# Patient Record
Sex: MALE | Race: WHITE | Employment: FULL TIME | URBAN - METROPOLITAN AREA
[De-identification: names, ages, dates, MRNs, and addresses within clinical notes are randomized per-mention and may not be internally consistent; named-entity substitution may affect disease eponyms.]

---

## 2019-05-21 LAB
HIV 1&2 EXT: NORMAL
INR PPP: 1.1 (ref 0.9–1.1)
TSH SERPL-ACNC: 1.4 UIU/ML (ref 0.3–4.5)

## 2019-05-28 ENCOUNTER — TRANSFERRED RECORDS (OUTPATIENT)
Dept: HEALTH INFORMATION MANAGEMENT | Facility: CLINIC | Age: 59
End: 2019-05-28

## 2019-06-07 ENCOUNTER — TRANSFERRED RECORDS (OUTPATIENT)
Dept: HEALTH INFORMATION MANAGEMENT | Facility: CLINIC | Age: 59
End: 2019-06-07

## 2020-04-16 LAB
ALT SERPL-CCNC: 35 U/L (ref 14–63)
AST SERPL-CCNC: 22 U/L (ref 15–37)
GLUCOSE SERPL-MCNC: 158 MG/DL (ref 74–106)
HBA1C MFR BLD: 7.7 % (ref 4.8–6)
HEP C HIM: NORMAL
POTASSIUM SERPL-SCNC: 4 MMOL/L (ref 3.5–5.1)

## 2020-05-12 ENCOUNTER — TRANSFERRED RECORDS (OUTPATIENT)
Dept: HEALTH INFORMATION MANAGEMENT | Facility: CLINIC | Age: 60
End: 2020-05-12

## 2020-06-12 ENCOUNTER — TRANSFERRED RECORDS (OUTPATIENT)
Dept: HEALTH INFORMATION MANAGEMENT | Facility: CLINIC | Age: 60
End: 2020-06-12

## 2020-06-12 ENCOUNTER — TRANSFERRED RECORDS (OUTPATIENT)
Dept: MULTI SPECIALTY CLINIC | Facility: CLINIC | Age: 60
End: 2020-06-12

## 2020-06-12 LAB
CREAT SERPL-MCNC: 0.6 MG/DL (ref 0.7–1.2)
GFR SERPL CREATININE-BSD FRML MDRD: >60 ML/MIN/1.73M2

## 2020-06-23 ENCOUNTER — TRANSCRIBE ORDERS (OUTPATIENT)
Dept: OTHER | Age: 60
End: 2020-06-23

## 2020-06-23 DIAGNOSIS — C22.0 HEPATOCELLULAR CARCINOMA (H): Primary | ICD-10-CM

## 2020-06-25 NOTE — TELEPHONE ENCOUNTER
RECORDS RECEIVED FROM: CE - Hepatocellular carcinoma .. Referred by Juliann Melgoza ... scheduled per patient.. patient having labs done on monday and will have them faxed to the clinic   Appt Date: 07.21.2020   NOTES STATUS DETAILS   OFFICE NOTE from referring provider Care Everywhere 06.19.2020 Juliann Melgoza, APRN, CNP  [facility]   OFFICE NOTES from other specialists N/A    DISCHARGE SUMMARY from hospital N/A    MEDICATION LIST Care Everywhere    LIVER BIOSPY (IF APPLICABLE)      PATHOLOGY REPORTS  N/A    IMAGING     ENDOSCOPY (IF AVAILABLE) N/A    COLONOSCOPY (IF AVAILABLE) N/A    ULTRASOUND LIVER N/A    CT OF ABDOMEN Care Everywhere 06.12.2020, 06.07.2019 CT Liver [facility]     MRI OF LIVER N/A    FIBROSCAN, US ELASTOGRAPHY, FIBROSIS SCAN, MR ELASTOGRAPHY N/A    LABS     HEPATIC PANEL (LIVER PANEL) Care Everywhere 08.05.2019   BASIC METABOLIC PANEL Care Everywhere 09.26.2019   COMPLETE METABOLIC PANEL Care Everywhere 04.16.2020   COMPLETE BLOOD COUNT (CBC) Care Everywhere 04.16.2020   INTERNATIONAL NORMALIZED RATIO (INR) N/A    HEPATITIS C ANTIBODY Care Everywhere 01.02.2020 04.19.2019   HEPATITIS C VIRAL LOAD/PCR N/A    HEPATITIS C GENOTYPE N/A    HEPATITIS B SURFACE ANTIGEN Care Everywhere 04.19.2019   HEPATITIS B SURFACE ANTIBODY Care Everywhere 04.19.2019   HEPATITIS B DNA QUANT LEVEL N/A    HEPATITIS B CORE ANTIBODY Care Everywhere 04.19.2019

## 2020-07-01 ENCOUNTER — VIRTUAL VISIT (OUTPATIENT)
Dept: GASTROENTEROLOGY | Facility: CLINIC | Age: 60
End: 2020-07-01
Attending: INTERNAL MEDICINE
Payer: COMMERCIAL

## 2020-07-01 ENCOUNTER — PRE VISIT (OUTPATIENT)
Dept: GASTROENTEROLOGY | Facility: CLINIC | Age: 60
End: 2020-07-01

## 2020-07-01 DIAGNOSIS — C22.0 HCC (HEPATOCELLULAR CARCINOMA) (H): Primary | ICD-10-CM

## 2020-07-01 DIAGNOSIS — B18.2 CHRONIC HEPATITIS C WITHOUT HEPATIC COMA (H): ICD-10-CM

## 2020-07-01 RX ORDER — OXYCODONE HYDROCHLORIDE 10 MG/1
10 TABLET ORAL
COMMUNITY
Start: 2019-04-08 | End: 2020-07-22

## 2020-07-01 RX ORDER — OMEPRAZOLE 40 MG/1
40 CAPSULE, DELAYED RELEASE ORAL
COMMUNITY
End: 2020-07-24

## 2020-07-01 RX ORDER — LISINOPRIL 20 MG/1
40 TABLET ORAL EVERY MORNING
COMMUNITY

## 2020-07-01 RX ORDER — GLIPIZIDE 10 MG/1
10 TABLET, FILM COATED, EXTENDED RELEASE ORAL EVERY MORNING
COMMUNITY
Start: 2019-02-01

## 2020-07-01 RX ORDER — ATENOLOL 100 MG/1
100 TABLET ORAL EVERY MORNING
COMMUNITY
Start: 2019-03-11

## 2020-07-01 RX ORDER — POLYETHYLENE GLYCOL 3350 17 G/17G
1 POWDER, FOR SOLUTION ORAL PRN
COMMUNITY
Start: 2019-04-05

## 2020-07-01 ASSESSMENT — PAIN SCALES - GENERAL: PAINLEVEL: EXTREME PAIN (8)

## 2020-07-01 NOTE — PROGRESS NOTES
"Jerome Schoen is a 59 year old male who is being evaluated via a billable telephone visit.      The patient has been notified of following:     \"This telephone visit will be conducted via a call between you and your physician/provider. We have found that certain health care needs can be provided without the need for a physical exam.  This service lets us provide the care you need with a short phone conversation.  If a prescription is necessary we can send it directly to your pharmacy.  If lab work is needed we can place an order for that and you can then stop by our lab to have the test done at a later time.    Telephone visits are billed at different rates depending on your insurance coverage. During this emergency period, for some insurers they may be billed the same as an in-person visit.  Please reach out to your insurance provider with any questions.    If during the course of the call the physician/provider feels a telephone visit is not appropriate, you will not be charged for this service.\"    Patient has given verbal consent for Telephone visit?  Yes    What phone number would you like to be contacted at? [phone]    How would you like to obtain your AVS? 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      HISTORY OF PRESENT ILLNESS:  Mr. Schoen is a 59-year-old  male from [place].  He did get diagnosed almost a year and half ago with hepatitis C.  He also has a history of alcohol use.  At that time, he was diagnosed with cirrhosis of the liver.  He did do treatment with glecaprevir/pibrentasvir, for 8 weeks and it appears that he cleared the virus and achieved a sustained virological response.  Anyway, on followup he did have a CT " scan of the liver with and without IV contrast and that was done for his cirrhosis and it showed, besides the cirrhosis with multiple indeterminate foci, a 1.8 cm hyperenhancing lesion and this was read as LI-RADS 3, but then MRI was asked.  The patient has issues with claustrophobia; hence, he did not have it and then now repeated CT scan  done on 06/12/2020.  It did show, besides the nodular liver, a 1.8 cm arterially enhancing lesion, which they thought demonstrated central washout and pseudocapsule formation, and this was read as diagnostic for hepatocellular carcinoma LI-RADS 5.  Please note that the lesion is in segment Lima.      Symptom-wise, he is still working as a .  He does not have any significant fluid retention nor does he have any abdominal distention.  He has no signs of confusion, lethargy and the like.  Never had GI bleeding.  He had an upper endoscopy done which showed small esophageal varices, varices grade 1, and portal hypertensive gastropathy.  He complains of right upper quadrant abdominal pain.  He denies any nausea or vomiting.  He has lost also around 35 pounds in the last 6-7 months.  He did have regular bowel movements and in fact had a colonoscopy at age 50.  Otherwise, he states his appetite is good.  He did not have any signs of jaundice.  He does not have any fever or shortness of breath, chest pain or cough.     Medical hx Surgical hx   Past Medical History:   Diagnosis Date     Cirrhosis (H)      Continuous tobacco abuse      DM type 2 (diabetes mellitus, type 2) (H)      HCC (hepatocellular carcinoma) (H)      HCV (hepatitis C virus)      HTN (hypertension)       No past surgical history on file.       Medications  Prior to Admission medications    Medication Sig Start Date End Date Taking? Authorizing Provider   atenolol (TENORMIN) 100 MG tablet  3/11/19  Yes Reported, Patient   glipiZIDE (GLUCOTROL XL) 10 MG 24 hr tablet Take 10 mg by mouth 2/1/19  Yes Reported,  Patient   lisinopril (ZESTRIL) 20 MG tablet Take 40 mg by mouth   Yes Reported, Patient   metFORMIN (GLUCOPHAGE) 500 MG tablet Take 500 mg by mouth 2/1/19  Yes Reported, Patient   omeprazole (PRILOSEC) 40 MG DR capsule Take 40 mg by mouth   Yes Reported, Patient   oxyCODONE IR (ROXICODONE) 10 MG tablet Take 10 mg by mouth 4/8/19 7/19/20 Yes Reported, Patient   polyethylene glycol (MIRALAX) 17 GM/SCOOP powder Use 1 (one) Capful daily 4/5/19  Yes Reported, Patient       Allergies  No Known Allergies    Family hx Social hx   Family History   Problem Relation Age of Onset     Cerebrovascular Disease Mother      Coronary Artery Disease Brother       Social History     Tobacco Use     Smoking status: Current Every Day Smoker     Types: Cigarettes     Smokeless tobacco: Never Used   Substance Use Topics     Alcohol use: Not Currently     Drug use: Not Currently          REVIEW OF SYSTEMS:  Denies any recent infections, has fatigue.  No headaches or seizures in the past, has some cough.  Please note that the patient has been a big time smoker from early age of 13.  He has some shortness of breath.  Denies any chest pain.  He denies any psychiatric diagnoses.  He has no eye, hearing or skin issues.  He has degenerative joint disease, mostly of the knees, and he claims to have some form of neuropathy.     Examination  There were no vitals taken for this visit.  There is no height or weight on file to calculate BMI.    Gen- well, NAD, A+Ox3, normal color  Extr- No KIMBERLEY  Neuro- A+Ox3, no asterixis  Skin- no rash or jaundice  Psych- normal mood    Laboratory  No results found for: NA, POTASSIUM, CHLORIDE, CO2, BUN, CR    No results found for: BILITOTAL, ALT, AST, ALKPHOS    No results found for: ALBUMIN, PROTTOTAL     No results found for: WBC, HGB, MCV, PLT    No results found for: INR      Radiology    ASSESSMENT AND PLAN:  Hepatocellular carcinoma.        Mr. Schoen has a small lesion of 1.8 in segment 4, which is compatible  with LI-RADS 5 lesion.  This is HCC as it has all the radiological characteristics.  We would like this repeated with an MRI which will be ordered.  The patient claims that he might have issues with claustrophobia.  If that is the case, then we will give him sedation when he is scheduled for an MRI.  We did explain that to him and he is acceptable of it.        He will be seen by Interventional Radiology as a consult to see if this could be treated.  We also discussed with him that since he has underlying cirrhosis that eventually he might benefit being evaluated for liver transplantation.  He has no idea what that entails and we explained to him what it is, and he is going to think about it.        He has a long time history of smoking.  He will need  Cardiology and Pulmonary to weigh in and he will need to quit tobacco and maybe even join some of those tobacco cessation programs.  We did explain all that to him.  He understands that.        Otherwise, he has cleared the hepatitis C, which is good as this is now well documented that early treatment with interferon-free direct acting antivirals in patients with HCC might increase survival and reduce mortality.        We will see him here within the coming 8-12 weeks, and in the interim, he will be seeing IR.  We also will repeat his labs at that time.  Please note, his alpha fetoproteins were normal.      The test was a 36-minute phone conversation with the patient.  We reviewed also his chart which took us another 15 minutes minimum, so entire visit lasted 50 minutes.     Lupe Mae MD  Hepatology  [facility]

## 2020-07-01 NOTE — LETTER
7/1/2020         RE: Jerome Schoen  [address]  [facility] [address]        Dear Colleague,    Thank you for referring your patient, Jerome Schoen, to the [facility] HEPATOLOGY. Please see a copy of my visit note below.    Jerome Schoen is a 59 year old male who is being evaluated via a billable telephone visit.            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      HISTORY OF PRESENT ILLNESS:  Mr. Schoen is a 59-year-old  male from [place].  He did get diagnosed almost a year and half ago with hepatitis C.  He also has a history of alcohol use.  At that time, he was diagnosed with cirrhosis of the liver.  He did do treatment with glecaprevir/pibrentasvir, for 8 weeks and it appears that he cleared the virus and achieved a sustained virological response.  Anyway, on followup he did have a CT scan of the liver with and without IV contrast and that was done for his cirrhosis and it showed, besides the cirrhosis with multiple indeterminate foci, a 1.8 cm hyperenhancing lesion and this was read as LI-RADS 3, but then MRI was asked.  The patient has issues with claustrophobia; hence, he did not have it and then now repeated CT scan  done on 06/12/2020.  It did show, besides the nodular liver, a 1.8 cm arterially enhancing lesion, which they thought demonstrated central washout and pseudocapsule formation, and this was read as diagnostic for hepatocellular carcinoma LI-RADS 5.  Please note that the lesion is in segment Lima.      Symptom-wise, he is still working as a .  He does not have any significant fluid retention nor does he have any abdominal distention.  He has no signs of confusion, lethargy and the like.  Never had GI bleeding.   He had an upper endoscopy done which showed small esophageal varices, varices grade 1, and portal hypertensive gastropathy.  He complains of right upper quadrant abdominal pain.  He denies any nausea or vomiting.  He has lost also around 35 pounds in the last 6-7 months.  He did have regular bowel movements and in fact had a colonoscopy at age 50.  Otherwise, he states his appetite is good.  He did not have any signs of jaundice.  He does not have any fever or shortness of breath, chest pain or cough.     Medical hx Surgical hx   Past Medical History:   Diagnosis Date     Cirrhosis (H)      Continuous tobacco abuse      DM type 2 (diabetes mellitus, type 2) (H)      HCC (hepatocellular carcinoma) (H)      HCV (hepatitis C virus)      HTN (hypertension)       No past surgical history on file.       Medications  Prior to Admission medications    Medication Sig Start Date End Date Taking? Authorizing Provider   atenolol (TENORMIN) 100 MG tablet  3/11/19  Yes Reported, Patient   glipiZIDE (GLUCOTROL XL) 10 MG 24 hr tablet Take 10 mg by mouth 2/1/19  Yes Reported, Patient   lisinopril (ZESTRIL) 20 MG tablet Take 40 mg by mouth   Yes Reported, Patient   metFORMIN (GLUCOPHAGE) 500 MG tablet Take 500 mg by mouth 2/1/19  Yes Reported, Patient   omeprazole (PRILOSEC) 40 MG DR capsule Take 40 mg by mouth   Yes Reported, Patient   oxyCODONE IR (ROXICODONE) 10 MG tablet Take 10 mg by mouth 4/8/19 7/19/20 Yes Reported, Patient   polyethylene glycol (MIRALAX) 17 GM/SCOOP powder Use 1 (one) Capful daily 4/5/19  Yes Reported, Patient       Allergies  No Known Allergies    Family hx Social hx   Family History   Problem Relation Age of Onset     Cerebrovascular Disease Mother      Coronary Artery Disease Brother       Social History     Tobacco Use     Smoking status: Current Every Day Smoker     Types: Cigarettes     Smokeless tobacco: Never Used   Substance Use Topics     Alcohol use: Not Currently     Drug use: Not Currently           REVIEW OF SYSTEMS:  Denies any recent infections, has fatigue.  No headaches or seizures in the past, has some cough.  Please note that the patient has been a big time smoker from early age of 13.  He has some shortness of breath.  Denies any chest pain.  He denies any psychiatric diagnoses.  He has no eye, hearing or skin issues.  He has degenerative joint disease, mostly of the knees, and he claims to have some form of neuropathy.     Examination  There were no vitals taken for this visit.  There is no height or weight on file to calculate BMI.    Gen- well, NAD, A+Ox3, normal color  Extr- No KIMBERLEY  Neuro- A+Ox3, no asterixis  Skin- no rash or jaundice  Psych- normal mood    Laboratory  No results found for: NA, POTASSIUM, CHLORIDE, CO2, BUN, CR    No results found for: BILITOTAL, ALT, AST, ALKPHOS    No results found for: ALBUMIN, PROTTOTAL     No results found for: WBC, HGB, MCV, PLT    No results found for: INR      Radiology    ASSESSMENT AND PLAN:  Hepatocellular carcinoma.        Mr. Schoen has a small lesion of 1.8 in segment 4, which is compatible with LI-RADS 5 lesion.  This is HCC as it has all the radiological characteristics.  We would like this repeated with an MRI which will be ordered.  The patient claims that he might have issues with claustrophobia.  If that is the case, then we will give him sedation when he is scheduled for an MRI.  We did explain that to him and he is acceptable of it.        He will be seen by Interventional Radiology as a consult to see if this could be treated.  We also discussed with him that since he has underlying cirrhosis that eventually he might benefit being evaluated for liver transplantation.  He has no idea what that entails and we explained to him what it is, and he is going to think about it.        He has a long time history of smoking.  He will need  Cardiology and Pulmonary to weigh in and he will need to quit tobacco and maybe even join some of those  tobacco cessation programs.  We did explain all that to him.  He understands that.        Otherwise, he has cleared the hepatitis C, which is good as this is now well documented that early treatment with interferon-free direct acting antivirals in patients with HCC might increase survival and reduce mortality.        We will see him here within the coming 8-12 weeks, and in the interim, he will be seeing IR.  We also will repeat his labs at that time.  Please note, his alpha fetoproteins were normal.      The test was a 36-minute phone conversation with the patient.  We reviewed also his chart which took us another 15 minutes minimum, so entire visit lasted 50 minutes.     Lupe Mae MD  Hepatology  [facility]

## 2020-07-13 ENCOUNTER — DOCUMENTATION ONLY (OUTPATIENT)
Dept: GASTROENTEROLOGY | Facility: CLINIC | Age: 60
End: 2020-07-13

## 2020-07-13 ENCOUNTER — TELEPHONE (OUTPATIENT)
Dept: GASTROENTEROLOGY | Facility: CLINIC | Age: 60
End: 2020-07-13

## 2020-07-13 DIAGNOSIS — F41.9 ANXIETY: ICD-10-CM

## 2020-07-13 DIAGNOSIS — C22.0 HCC (HEPATOCELLULAR CARCINOMA) (H): Primary | ICD-10-CM

## 2020-07-13 RX ORDER — DIAZEPAM 5 MG
10 TABLET ORAL ONCE
Qty: 1 TABLET | Refills: 0 | COMMUNITY
Start: 2020-07-13 | End: 2020-07-24

## 2020-07-13 NOTE — TELEPHONE ENCOUNTER
Called pt and let him know he will need labs and MRI ASAP.  Orders faxed to [facility].  IR consult, transplant referral appt will need to be scheduled.  Pt states he is flexible and able to schedule any time.      [facility]    Phone Message    May a detailed message be left on voicemail: yes     Reason for Call: Other: . pt said he is requesting a call back to understand next steps in his care plan. Please call pt back.    Action Taken: Message routed to:  [facility] .hep    Travel Screening: Not Applicable

## 2020-07-13 NOTE — PROGRESS NOTES
Lab and MRI orders were faxed to [facility] to [phone].    Clinic fax and address given to send back results and mail MRI imaging disc once completed.    Adeola Miranda, [facility]  7/13/2020 12:32 PM

## 2020-07-17 ENCOUNTER — DOCUMENTATION ONLY (OUTPATIENT)
Dept: TRANSPLANT | Facility: CLINIC | Age: 60
End: 2020-07-17

## 2020-07-17 ENCOUNTER — HOSPITAL ENCOUNTER (INPATIENT)
Dept: GENERAL RADIOLOGY | Facility: CLINIC | Age: 60
End: 2020-07-17
Attending: INTERNAL MEDICINE
Payer: COMMERCIAL

## 2020-07-17 NOTE — PROGRESS NOTES
Jerome Schoen discussed at the multidisciplinary tumor board on 20. The attendees may consist of representatives from hepatology, oncology, radiation oncology, surgical subspecialty including liver transplant, pathology and radiology.    CT 20 Imaginb 2.0 cm HCC    Plan/Recommendations:  MRI- assess other lesions  Start Txp evaluation    HUGO Collins, RN  Liver transplant coordinator  [phone] Office

## 2020-07-20 ENCOUNTER — HOSPITAL ENCOUNTER (INPATIENT)
Dept: GENERAL RADIOLOGY | Facility: CLINIC | Age: 60
End: 2020-07-20
Attending: RADIOLOGY
Payer: COMMERCIAL

## 2020-07-20 ENCOUNTER — TRANSFERRED RECORDS (OUTPATIENT)
Dept: HEALTH INFORMATION MANAGEMENT | Facility: CLINIC | Age: 60
End: 2020-07-20

## 2020-07-20 PROBLEM — I10 ESSENTIAL HYPERTENSION: Status: ACTIVE | Noted: 2019-11-04

## 2020-07-20 PROBLEM — B19.20 VIRAL HEPATITIS C: Status: ACTIVE | Noted: 2019-11-04

## 2020-07-20 PROBLEM — E11.9 TYPE 2 DIABETES MELLITUS WITHOUT COMPLICATIONS (H): Status: ACTIVE | Noted: 2019-11-04

## 2020-07-20 PROBLEM — E78.5 DYSLIPIDEMIA: Status: ACTIVE | Noted: 2019-11-04

## 2020-07-20 PROBLEM — I77.810 AORTIC ROOT DILATATION (H): Status: ACTIVE | Noted: 2019-11-04

## 2020-07-20 PROBLEM — K74.60 CIRRHOSIS OF LIVER (H): Status: ACTIVE | Noted: 2019-11-04

## 2020-07-20 PROBLEM — R10.812 LEFT UPPER QUADRANT ABDOMINAL TENDERNESS: Status: ACTIVE | Noted: 2019-04-15

## 2020-07-21 NOTE — TELEPHONE ENCOUNTER
DIAGNOSIS: new pt HCC    DATE RECEIVED: 7.22.20   NOTES STATUS DETAILS   OFFICE NOTE from referring provider Internal 7.1.20  Dr. Lupe Mae  [facility] Hepatology/Gastro   OFFICE NOTE from other specialist CE 5.11.20, 6.13.19  JAYME Mayo CNP   Specialty Gastro   DISCHARGE SUMMARY from hospital N/A    DISCHARGE REPORT from the ER N/A    OPERATIVE REPORT N/A    MEDICATION LIST Internal    XRAYS (IMAGES & REPORTS) Pacs 7.20.20  MR Abd/Liver    6.12.20, 6.7.19  CT Liver   PATHOLOGY  Slides & report N/A

## 2020-07-22 ENCOUNTER — VIRTUAL VISIT (OUTPATIENT)
Dept: RADIOLOGY | Facility: CLINIC | Age: 60
End: 2020-07-22
Attending: RADIOLOGY
Payer: COMMERCIAL

## 2020-07-22 ENCOUNTER — PRE VISIT (OUTPATIENT)
Dept: RADIOLOGY | Facility: CLINIC | Age: 60
End: 2020-07-22

## 2020-07-22 DIAGNOSIS — C22.0 HCC (HEPATOCELLULAR CARCINOMA) (H): ICD-10-CM

## 2020-07-22 DIAGNOSIS — B18.2 CHRONIC HEPATITIS C WITHOUT HEPATIC COMA (H): ICD-10-CM

## 2020-07-22 DIAGNOSIS — Z11.59 ENCOUNTER FOR SCREENING FOR OTHER VIRAL DISEASES: Primary | ICD-10-CM

## 2020-07-22 PROCEDURE — 40001009 ZZH VIDEO/TELEPHONE VISIT; NO CHARGE

## 2020-07-22 RX ORDER — LANSOPRAZOLE 30 MG/1
30 CAPSULE, DELAYED RELEASE ORAL PRN
COMMUNITY
Start: 2019-02-01 | End: 2020-07-24

## 2020-07-22 RX ORDER — OXYCODONE HYDROCHLORIDE 10 MG/1
5 TABLET ORAL PRN
COMMUNITY

## 2020-07-22 RX ORDER — PRAVASTATIN SODIUM 40 MG
40 TABLET ORAL EVERY MORNING
COMMUNITY
Start: 2019-02-01 | End: 2020-07-24

## 2020-07-22 RX ORDER — CHLORTHALIDONE 25 MG/1
25 TABLET ORAL EVERY MORNING
COMMUNITY
Start: 2019-02-01

## 2020-07-22 NOTE — PATIENT INSTRUCTIONS
"Hema you have had a virtual visit today with Dr Domenic ANDRADE to discuss treatment of your liver cancer or HCC.  Your abdomen MRI completed on 7/20/20 has been reviewed with you during this visit.    Plan:    PAC's evaluation, appointment at the [facility].  I will schedule you and contact you.    I will also contact you for your procedure details for liver ablation and CHEMOEMBOLIZATION to your liver tumor with Dr Sheth.    For these procedures  check into the Pre-op Area, 3rd floor , at [facility] located at [address] [place] [address].      Reminders:    -No solids or milk products 6 hours prior to appointment time.    -No clear liquids 2 hours prior to appointment time.     -Please take your morning medications as indicated with water, with the exception of the medications listed below:    Please hold your diabetes medications the morning of the procedure as you will not be eating.    --You may be admitted overnight after the procedure for pain management.    -Post-Procedure follow up: I will call you 2-3 days after you are discharged to follow up after the procedure.     You will be scheduled for follow-up 1 month after your procedure for imaging and labs as well as a visit with Dr. Sheth.      *Please keep in mind that you may experience  Post Embolization syndrome.      This can include many \"flu-like\" symptoms:    -Fevers: may be as high as 102 degrees, can last for a couple of days. We recommend using over the counter medications such as Tylenol or Ibuprofen.     -Nausea: this is common after this procedure, we will give you medication to help with this.     -Decreased appetite: try to stay hydrated with water if you are unable to eat regular meals.    -Fatigue: this is normal and you should rest, however we do still want you to try to be up and walking around intermittently throughout the day.     **Please keep in mind to stay hydrated after the procedure.    *Abnormal " symptoms in which to call or seek immediate help:  1. Confusion  2. Swelling of the lower legs  3. Severe abdominal pain that doesn't go away with pain medications.   4. High fevers that do not come down with over the counter medications.     Should you exhibit any of the above symptoms, please seek immediate medical attention at your local emergency room or call our hospital at [phone] and ask for the Interventional Radiologist On-call (after hours). You can also contact me (during business hours) Monday - Friday, 7-3:30 pm.    Please feel free to call me with any questions or concerns.    Caitlin Jaime RN, BSN  Interventional Radiology Care Coordinator  Office: [phone]

## 2020-07-22 NOTE — LETTER
July 22, 2020    Jerome Schoen  [address] [place] [id number]    Dear eHma,     I have you set up for your HCC treatment as follows    PAC's evaluation Pre Anesthesia Clinic 5th floor of the [facility], located at [address] [facility].  Check in at 730 am for an 8 am appointment. This will be your history and physical for your procedure.    -Your chemoembolization and ablation to your liver tumor with Dr Sheth is scheduled for Wednesday July 29th @ 12:30 pm, check in at 10:30 am to the Pre op unit 3C, located on the 2nd floor of the [facility], [address], [facility].      Reminders:    -Nothing to eat or drink after midnight.    -Please have a  as you will be going home afterwards.    -Please do take your morning medications as indicated with just enough water to swallow, with the exception of the medications listed below:     -PLEASE ALSO HOLD YOUR  METFORMIN and Glipizide THE DAY OF THE PROCEDURE.         -WHAT TO DO INCASE OF THE FOLLOWING SYMPTOMS:       Please call me during business hours (M-F 8-4PM)  should you develop the following symptoms:     1. Fever over 102, that does not come down with over the counter medications (EX. Tylenol, ibuprofen)  2. Swelling in the lower legs.  3. Vomiting, Nausea- We will give you anti-nausea medication  4. Uncontrollable pain, with the use of pain medications  5. Or may have any other questions that I can assist you with    However, if it is after-hours or on the weekend,  please call the hospital's main number: [phone] and ask for the Interventional Radiologist On-call.     *After the procedure I will call and follow up with you 2-3 days afterwards and our  will call you with a 1 month follow up appointment with imaging and lab work.    It is requested that you get tested no sooner than 4 days prior to procedure, testing here at an [facility] facility (there are multiple locations) is resulted within  "that time frame.  A rapid testing for patients such as yourself to get tested \"day of\" procedure is in the making, but unfortunately it is not up and running yet.  The Covid team member will contact you about 6 days prior to procedure to help you schedule a test at a facility that is acceptable to you.  Please call the COVID scheduling team at [phone], if you need assistance in getting testing scheduled or more information on testing sites.          There is a risk of your treatment/procedure being rescheduled if you are Symptomatic, have a positive covid or no covid result.    In other words it is recommended you get tested somewhere that the results are back in time.  It isn't convenient for patients such as yourself who travel to the [place] for surgeries/treatments.  Unfortunately that is the current state that we are living in.      Should you have any further questions, please call us at anytime. It has been a pleasure to see you today.      Thank you,     Caitlin SCHWARTZ RN, BSN  Interventional Radiology Nurse Coordinator   Phone: [phone]              "

## 2020-07-22 NOTE — LETTER
"    7/22/2020         RE: Jerome Schoen  [address] [place] [id number]        Dear Colleague,    Thank you for referring your patient, Jerome Schoen, to the [facility]. Please see a copy of my visit note below.    Jerome Schoen is a 59 year old male who is being evaluated via a billable telephone visit.          Quynh Walsh CMA    Phone call duration: 30 minutes    Shira Sheth MD                INTERVENTIONAL RADIOLOGY CONSULTATION    Name: Jerome Schoen  Age: 59 year old   Referring Physician: Dr. Mae   REASON FOR REFERRAL: Unresectable HCC    HPI: This is a telephone visit conducted today.  Mr. Schoen is a 59-year-old  male hepatitis C cirrhosis.  He also has a history of alcohol use.    His hepatitis C was treated and he achieved a sustained virological response.  CT scan of the liver showed cirrhosis showed multiple arterially enhancing lesions, thus MRI was recommended.      No ascites or pleural effusion, jaundice, encephalopathy, episode of liver failure. He is a  and has hemmorhoids, but reports no episode of GI bleeding. Prior endoscopy 5/2019 showed portal gastropathy, \"mild\" esophageal varices.  He has been having right upper quadrant abdominal pain since February 2020. Pain is constant, usually achy, but can occasionally get sharp. Worse with lying down. No worsening with eating. Feels best if he is moving around. Some mild leg swelling, gets better with rest and leg elevation.    No fever, cough, dyspnea, chest pain.     Smokes 1 ppd x, rare alcohol socially (2 beers per month \"at most\").     PAST MEDICAL HISTORY:   Past Medical History:   Diagnosis Date     Cirrhosis (H)      Continuous tobacco abuse      DM type 2 (diabetes mellitus, type 2) (H)      HCC (hepatocellular carcinoma) (H)      HCV (hepatitis C virus)      HTN (hypertension)        PAST SURGICAL HISTORY:   No past surgical history on file.    FAMILY HISTORY: "   Family History   Problem Relation Age of Onset     Cerebrovascular Disease Mother      Coronary Artery Disease Brother        SOCIAL HISTORY:   Social History     Tobacco Use     Smoking status: Current Every Day Smoker     Types: Cigarettes     Smokeless tobacco: Never Used   Substance Use Topics     Alcohol use: Not Currently       PROBLEM LIST:   Patient Active Problem List    Diagnosis Date Noted     Aortic root dilatation (H) 11/04/2019     Priority: Medium     4.0 on echo       Cirrhosis of liver (H) 11/04/2019     Priority: Medium     Dyslipidemia 11/04/2019     Priority: Medium     Essential hypertension 11/04/2019     Priority: Medium     Type 2 diabetes mellitus without complications (H) 11/04/2019     Priority: Medium     Viral hepatitis C 11/04/2019     Priority: Medium     Left upper quadrant abdominal tenderness 04/15/2019     Priority: Medium     Tobacco user 12/15/2009     Priority: Medium       MEDICATIONS:   Prescription Medications as of 7/22/2020       Rx Number Disp Refills Start End Last Dispensed Date Next Fill Date Owning Pharmacy    atenolol (TENORMIN) 100 MG tablet    3/11/2019    [facility] [address]    Class: Historical    Route: Oral    chlorthalidone (HYGROTON) 25 MG tablet    2/1/2019        Sig: Take 25 mg by mouth    Class: Historical    Route: Oral    glipiZIDE (GLUCOTROL XL) 10 MG 24 hr tablet    2/1/2019    [facility] [address]    Sig: Take 10 mg by mouth    Class: Historical    Route: Oral    LANsoprazole (PREVACID) 30 MG DR capsule    2/1/2019        Sig: Take 30 mg by mouth    Class: Historical    Route: Oral    lisinopril (ZESTRIL) 20 MG tablet        [facility] [address]    Sig: Take 40 mg by mouth    Class: Historical    Route: Oral    metFORMIN (GLUCOPHAGE) 500 MG tablet    2/1/2019    [facility] [address]    Sig: Take 500 mg by mouth    Class: Historical    Route: Oral    omeprazole (PRILOSEC) 40 MG DR capsule        [facility] [address]    Sig: Take 40 mg by mouth    Class: Historical    Route: Oral    oxyCODONE IR (ROXICODONE) 10 MG tablet            Sig: Take 5 mg by mouth every 4 hours as needed for severe pain    Class: Historical    Route: Oral    polyethylene glycol (MIRALAX) 17 GM/SCOOP powder    4/5/2019    [place] - [address]    Sig: Use 1 (one) Capful daily    Class: Historical    pravastatin (PRAVACHOL) 40 MG tablet    2/1/2019        Sig: Take 40 mg by mouth    Class: Historical    Route: Oral    diazepam (VALIUM) 5 MG tablet  1 tablet 0 7/13/2020        Sig: Take 2 tablets (10 mg) by mouth once for 1 dose 30 minutes before MRI.    Class: Historical    Route: Oral          ALLERGIES:   Patient has no known allergies.    ROS:  Skin: negative  Eyes: negative  Ears/Nose/Throat: negative  Respiratory: No shortness of breath, dyspnea on exertion, cough, or hemoptysis  Cardiovascular: negative  Gastrointestinal: as above  Genitourinary: negative  Musculoskeletal: back pain, chronic  Neurologic: peripheral neuropathy from diabetes  Psychiatric: negative  Hematologic/Lymphatic/Immunologic: negative  Endocrine: diabetes      Physical Examination:   VITALS:   There were no vitals taken for this visit.  Constitutional: Answers questions appropriately  Respiratory: Normal respiratory effort    Labs:    BMP RESULTS:  Lab Results   Component Value Date    POTASSIUM 4.0 04/16/2020     (H) 04/16/2020    CR 0.6 (L) 06/12/2020    GFRESTIMATED >60 06/12/2020    GFRESTBLACK >60 06/12/2020        LAB RESULTS:    Sodium 131  Creatinine 0.66/GFR greater than 90  Albumin 3.7  Total bilirubin 0.5  Platelets 74  INR 1.1    INR/PTT:  Lab Results   Component Value Date    INR 1.1 05/21/2019       Diagnostic studies: Imaging reviewed by  me. It shows an enhancing 2.1 cm lesion in segment 8 with washout, rim, diffusion restriction c/w HCC (OPTN 5).  The lesion is adjacent to the middle hepatic vein and an ascending branch of the right portal vein.    Radiologist Impression:  IMPRESSION:  1. Cirrhotic liver with evidence of portal venous hypertension  including small gastroesophageal varices and splenomegaly.  2. Single suspicious arterial enhancing lesion in segment VIII of the  liver measures 2.1 cm and has an enhancing rim. This lesion was  faintly visible on 6/7/2019 and measured 1.5 cm.  Li-RADS 5.  3. Periportal lymphadenopathy is likely secondary to cirrhosis.  Metastasis cannot be entirely excluded.     Assessment: 58 yo M with Hep C, EtOH cirrhosis with unresectable, 2.1 cm segment 8 HCC.     MELD 7  Child Lopez A (5 points)  ECOG 0    I discussed treatment options with Mr. Schoen including percutaneous ablation, TACE, and radioembolization.  Given the size of the tumor, percutaneous ablation is the most reasonable treatment option and could provide a possible cure.  I explained that angiography and TACE/lipiodol staining may be considered in order to better visualize the lesion as well as provide synergistic treatment effect.  I explained that this is an outpatient procedure done under general anesthesia.  I discussed the specifics of angiography and transarterial chemoembolization/lipiodol deposition and the risks of arterial injury, nontarget embolization, suboptimal treatment effect.  I discussed that the microwave ablation portion of the procedure, and explained that this involves placement of a percutaneous needle through the abdominal wall into the liver lesion, with subsequent thermal ablation.  I discussed the risks of incomplete response/residual tumor, bleeding, vascular injury, infection.  All of his questions were answered.  He would like to proceed with treatment as soon as possible.    Plan:  Percutaneous microwave ablation  under general anesthesia with simultaneous angiography and possible TACE.    It was a pleasure to speak to Mr. Schoen today.  Thank you for involving the interventional radiology service in his care.    I spent a total of 30 minutes with this patient over 50% time was for counseling and care coordination.    Shira Sheth MD  Interventional Radiology   Pager [phone]    CC  Patient Care Team:  Yamileth Allan PA-C as PCP - JACQUES Maldonado

## 2020-07-22 NOTE — PROGRESS NOTES
"Jerome Schoen is a 59 year old male who is being evaluated via a billable telephone visit.      The patient has been notified of following:     \"This telephone visit will be conducted via a call between you and your physician/provider. We have found that certain health care needs can be provided without the need for a physical exam.  This service lets us provide the care you need with a short phone conversation.  If a prescription is necessary we can send it directly to your pharmacy.  If lab work is needed we can place an order for that and you can then stop by our lab to have the test done at a later time.    Telephone visits are billed at different rates depending on your insurance coverage. During this emergency period, for some insurers they may be billed the same as an in-person visit.  Please reach out to your insurance provider with any questions.    If during the course of the call the physician/provider feels a telephone visit is not appropriate, you will not be charged for this service.\"    Patient has given verbal consent for Telephone visit?  Yes    What phone number would you like to be contacted at? [phone]    How would you like to obtain your AVS? Mail a copy    Quynh Walsh CMA    Phone call duration: 30 minutes    Shira Sheth MD                INTERVENTIONAL RADIOLOGY CONSULTATION    Name: Jerome Schoen  Age: 59 year old   Referring Physician: Dr. Mae   REASON FOR REFERRAL: Unresectable HCC    HPI: This is a telephone visit conducted today.  Mr. Schoen is a 59-year-old  male hepatitis C cirrhosis.  He also has a history of alcohol use.    His hepatitis C was treated and he achieved a sustained virological response.  CT scan of the liver showed cirrhosis showed multiple arterially enhancing lesions, thus MRI was recommended.      No ascites or pleural effusion, jaundice, encephalopathy, episode of liver failure. He is a  and has hemmorhoids, but " "reports no episode of GI bleeding. Prior endoscopy 5/2019 showed portal gastropathy, \"mild\" esophageal varices.  He has been having right upper quadrant abdominal pain since February 2020. Pain is constant, usually achy, but can occasionally get sharp. Worse with lying down. No worsening with eating. Feels best if he is moving around. Some mild leg swelling, gets better with rest and leg elevation.    No fever, cough, dyspnea, chest pain.     Smokes 1 ppd x, rare alcohol socially (2 beers per month \"at most\").     PAST MEDICAL HISTORY:   Past Medical History:   Diagnosis Date     Cirrhosis (H)      Continuous tobacco abuse      DM type 2 (diabetes mellitus, type 2) (H)      HCC (hepatocellular carcinoma) (H)      HCV (hepatitis C virus)      HTN (hypertension)        PAST SURGICAL HISTORY:   No past surgical history on file.    FAMILY HISTORY:   Family History   Problem Relation Age of Onset     Cerebrovascular Disease Mother      Coronary Artery Disease Brother        SOCIAL HISTORY:   Social History     Tobacco Use     Smoking status: Current Every Day Smoker     Types: Cigarettes     Smokeless tobacco: Never Used   Substance Use Topics     Alcohol use: Not Currently       PROBLEM LIST:   Patient Active Problem List    Diagnosis Date Noted     Aortic root dilatation (H) 11/04/2019     Priority: Medium     4.0 on echo       Cirrhosis of liver (H) 11/04/2019     Priority: Medium     Dyslipidemia 11/04/2019     Priority: Medium     Essential hypertension 11/04/2019     Priority: Medium     Type 2 diabetes mellitus without complications (H) 11/04/2019     Priority: Medium     Viral hepatitis C 11/04/2019     Priority: Medium     Left upper quadrant abdominal tenderness 04/15/2019     Priority: Medium     Tobacco user 12/15/2009     Priority: Medium       MEDICATIONS:   Prescription Medications as of 7/22/2020       Rx Number Disp Refills Start End Last Dispensed Date Next Fill Date Owning Pharmacy    atenolol " (TENORMIN) 100 MG tablet    3/11/2019    [facility] [address]    Class: Historical    Route: Oral    chlorthalidone (HYGROTON) 25 MG tablet    2/1/2019        Sig: Take 25 mg by mouth    Class: Historical    Route: Oral    glipiZIDE (GLUCOTROL XL) 10 MG 24 hr tablet    2/1/2019    [facility] [address]    Sig: Take 10 mg by mouth    Class: Historical    Route: Oral    LANsoprazole (PREVACID) 30 MG DR capsule    2/1/2019        Sig: Take 30 mg by mouth    Class: Historical    Route: Oral    lisinopril (ZESTRIL) 20 MG tablet        [place] [address]    Sig: Take 40 mg by mouth    Class: Historical    Route: Oral    metFORMIN (GLUCOPHAGE) 500 MG tablet    2/1/2019    [place] [address]    Sig: Take 500 mg by mouth    Class: Historical    Route: Oral    omeprazole (PRILOSEC) 40 MG DR capsule        [facility] [address]    Sig: Take 40 mg by mouth    Class: Historical    Route: Oral    oxyCODONE IR (ROXICODONE) 10 MG tablet            Sig: Take 5 mg by mouth every 4 hours as needed for severe pain    Class: Historical    Route: Oral    polyethylene glycol (MIRALAX) 17 GM/SCOOP powder    4/5/2019    [place] [address]    Sig: Use 1 (one) Capful daily    Class: Historical    pravastatin (PRAVACHOL) 40 MG tablet    2/1/2019        Sig: Take 40 mg by mouth    Class: Historical    Route: Oral    diazepam (VALIUM) 5 MG tablet  1 tablet 0 7/13/2020        Sig: Take 2 tablets (10 mg) by mouth once for 1 dose 30 minutes before MRI.    Class: Historical    Route: Oral          ALLERGIES:   Patient has no known allergies.    ROS:  Skin: negative  Eyes: negative  Ears/Nose/Throat: negative  Respiratory: No shortness of breath, dyspnea on exertion, cough, or  hemoptysis  Cardiovascular: negative  Gastrointestinal: as above  Genitourinary: negative  Musculoskeletal: back pain, chronic  Neurologic: peripheral neuropathy from diabetes  Psychiatric: negative  Hematologic/Lymphatic/Immunologic: negative  Endocrine: diabetes      Physical Examination:   VITALS:   There were no vitals taken for this visit.  Constitutional: Answers questions appropriately  Respiratory: Normal respiratory effort    Labs:    BMP RESULTS:  Lab Results   Component Value Date    POTASSIUM 4.0 04/16/2020     (H) 04/16/2020    CR 0.6 (L) 06/12/2020    GFRESTIMATED >60 06/12/2020    GFRESTBLACK >60 06/12/2020        LAB RESULTS:    Sodium 131  Creatinine 0.66/GFR greater than 90  Albumin 3.7  Total bilirubin 0.5  Platelets 74  INR 1.1    INR/PTT:  Lab Results   Component Value Date    INR 1.1 05/21/2019       Diagnostic studies: Imaging reviewed by me. It shows an enhancing 2.1 cm lesion in segment 8 with washout, rim, diffusion restriction c/w HCC (OPTN 5).  The lesion is adjacent to the middle hepatic vein and an ascending branch of the right portal vein.    Radiologist Impression:  IMPRESSION:  1. Cirrhotic liver with evidence of portal venous hypertension  including small gastroesophageal varices and splenomegaly.  2. Single suspicious arterial enhancing lesion in segment VIII of the  liver measures 2.1 cm and has an enhancing rim. This lesion was  faintly visible on 6/7/2019 and measured 1.5 cm.  Li-RADS 5.  3. Periportal lymphadenopathy is likely secondary to cirrhosis.  Metastasis cannot be entirely excluded.     Assessment: 60 yo M with Hep C, EtOH cirrhosis with unresectable, 2.1 cm segment 8 HCC.     MELD 7  Child Lopez A (5 points)  ECOG 0    I discussed treatment options with Mr. Schoen including percutaneous ablation, TACE, and radioembolization.  Given the size of the tumor, percutaneous ablation is the most reasonable treatment option and could provide a possible cure.  I explained  that angiography and TACE/lipiodol staining may be considered in order to better visualize the lesion as well as provide synergistic treatment effect.  I explained that this is an outpatient procedure done under general anesthesia.  I discussed the specifics of angiography and transarterial chemoembolization/lipiodol deposition and the risks of arterial injury, nontarget embolization, suboptimal treatment effect.  I discussed that the microwave ablation portion of the procedure, and explained that this involves placement of a percutaneous needle through the abdominal wall into the liver lesion, with subsequent thermal ablation.  I discussed the risks of incomplete response/residual tumor, bleeding, vascular injury, infection.  All of his questions were answered.  He would like to proceed with treatment as soon as possible.    Plan:  Percutaneous microwave ablation under general anesthesia with simultaneous angiography and possible TACE.    It was a pleasure to speak to Mr. Schoen today.  Thank you for involving the interventional radiology service in his care.    I spent a total of 30 minutes with this patient over 50% time was for counseling and care coordination.    Shira Sheth MD  Interventional Radiology   Pager [phone]    CC  Patient Care Team:  Yamileth Allan PA-C as PCP - JACQUES Maldonado

## 2020-07-23 NOTE — TELEPHONE ENCOUNTER
FUTURE VISIT INFORMATION      SURGERY INFORMATION:    Date: 20    Location: [facility] or    Surgeon:  Shira Sheth MD     Anesthesia Type:  general    Procedure: ANESTHESIA OUT OF OR liver ablation clissic case @12:30     Consult: virtual visit     RECORDS REQUESTED FROM:       Primary Care Provider: Yamileth Allan APRN, CNP- [facility]    Pertinent Medical History: Aortic root dilatation, hypertension    Most recent EKG+ Tracin19- [facility]    Most recent ECHO: 10/16/19- [facility]    Most recent Cardiac Stress Test: 19- [facility]

## 2020-07-24 ENCOUNTER — OFFICE VISIT (OUTPATIENT)
Dept: SURGERY | Facility: CLINIC | Age: 60
End: 2020-07-24
Payer: COMMERCIAL

## 2020-07-24 ENCOUNTER — ANESTHESIA EVENT (OUTPATIENT)
Dept: SURGERY | Facility: CLINIC | Age: 60
End: 2020-07-24
Payer: COMMERCIAL

## 2020-07-24 ENCOUNTER — PRE VISIT (OUTPATIENT)
Dept: SURGERY | Facility: CLINIC | Age: 60
End: 2020-07-24

## 2020-07-24 VITALS
WEIGHT: 199 LBS | TEMPERATURE: 98.4 F | HEIGHT: 68 IN | SYSTOLIC BLOOD PRESSURE: 135 MMHG | OXYGEN SATURATION: 99 % | RESPIRATION RATE: 20 BRPM | BODY MASS INDEX: 30.16 KG/M2 | DIASTOLIC BLOOD PRESSURE: 89 MMHG | HEART RATE: 64 BPM

## 2020-07-24 DIAGNOSIS — Z01.818 PREOP EXAMINATION: ICD-10-CM

## 2020-07-24 DIAGNOSIS — B18.2 CHRONIC HEPATITIS C WITHOUT HEPATIC COMA (H): ICD-10-CM

## 2020-07-24 DIAGNOSIS — C22.0 HEPATOCELLULAR CARCINOMA (H): ICD-10-CM

## 2020-07-24 DIAGNOSIS — C22.0 HCC (HEPATOCELLULAR CARCINOMA) (H): ICD-10-CM

## 2020-07-24 DIAGNOSIS — Z01.818 PREOP EXAMINATION: Primary | ICD-10-CM

## 2020-07-24 LAB
ALBUMIN SERPL-MCNC: 3.7 G/DL (ref 3.4–5)
ALP SERPL-CCNC: 82 U/L (ref 40–150)
ALT SERPL W P-5'-P-CCNC: 32 U/L (ref 0–70)
ANION GAP SERPL CALCULATED.3IONS-SCNC: 4 MMOL/L (ref 3–14)
AST SERPL W P-5'-P-CCNC: 14 U/L (ref 0–45)
BILIRUB SERPL-MCNC: 0.5 MG/DL (ref 0.2–1.3)
BUN SERPL-MCNC: 12 MG/DL (ref 7–30)
CALCIUM SERPL-MCNC: 8.8 MG/DL (ref 8.5–10.1)
CHLORIDE SERPL-SCNC: 97 MMOL/L (ref 94–109)
CO2 SERPL-SCNC: 30 MMOL/L (ref 20–32)
CREAT SERPL-MCNC: 0.66 MG/DL (ref 0.66–1.25)
ERYTHROCYTE [DISTWIDTH] IN BLOOD BY AUTOMATED COUNT: 13.2 % (ref 10–15)
GFR SERPL CREATININE-BSD FRML MDRD: >90 ML/MIN/{1.73_M2}
GLUCOSE SERPL-MCNC: 237 MG/DL (ref 70–99)
HBA1C MFR BLD: 6.9 % (ref 0–5.6)
HCT VFR BLD AUTO: 43.3 % (ref 40–53)
HGB BLD-MCNC: 14.7 G/DL (ref 13.3–17.7)
INR PPP: 1.1 (ref 0.86–1.14)
MCH RBC QN AUTO: 29.5 PG (ref 26.5–33)
MCHC RBC AUTO-ENTMCNC: 33.9 G/DL (ref 31.5–36.5)
MCV RBC AUTO: 87 FL (ref 78–100)
NT-PROBNP SERPL-MCNC: 80 PG/ML (ref 0–125)
PLATELET # BLD AUTO: 74 10E9/L (ref 150–450)
POTASSIUM SERPL-SCNC: 3.5 MMOL/L (ref 3.4–5.3)
PROT SERPL-MCNC: 7.3 G/DL (ref 6.8–8.8)
RBC # BLD AUTO: 4.99 10E12/L (ref 4.4–5.9)
SODIUM SERPL-SCNC: 131 MMOL/L (ref 133–144)
WBC # BLD AUTO: 7.3 10E9/L (ref 4–11)

## 2020-07-24 ASSESSMENT — MIFFLIN-ST. JEOR: SCORE: 1692.16

## 2020-07-24 ASSESSMENT — LIFESTYLE VARIABLES: TOBACCO_USE: 1

## 2020-07-24 ASSESSMENT — COPD QUESTIONNAIRES: COPD: 0

## 2020-07-24 ASSESSMENT — PAIN SCALES - GENERAL: PAINLEVEL: EXTREME PAIN (8)

## 2020-07-24 NOTE — PATIENT INSTRUCTIONS
Preparing for Your Surgery      Name:  Jerome Schoen   MRN:  [id number]   :  1960   Today's Date:  2020     Arriving for surgery:  Surgery date:  20  Arrival time:  10:30 am    Restrictions due to COVID 19:  Patients are allowed one visitor in the pre-op period  All visitors must wear a mask  No visitors under 18  No ill visitors   parking is not available     Please come to:  [facility] [address]  [address] [place]  [address]     -    Please proceed to the Surgery Lounge on the 3rd floor. [phone]?     - ?If you are in need of directions, wheelchair or escort please stop at the Information Desk in the lobby.  Inform the information person that you are here for surgery; a wheelchair and escort will be provided to the Surgery Lounge .?     What can I eat or drink?  -  You may eat and drink normally for up to 8 hours before your surgery. (Until 4:30 am )  -  You may have clear liquids until 2 hours before surgery. (Until 10:30 am )  Examples of clear liquids:  Water  Clear broth  Juices (apple, white grape, white cranberry  and cider) without pulp  Sodas (Sprite, 7-Up, ginger ale and seltzer)  Coffee or tea (without milk or cream)  Gatorade    -  No Alcohol for at least 24 hours before surgery     Which medicines can I take?  Hold Aspirin for 7 days before surgery.   Hold Multivitamins for 7 days before surgery.  Hold Supplements (Apple Cider Vinegar) for 7 days before surgery. Hold starting now, if you have not already.  Hold Ibuprofen (Advil, Motrin) for 1 day before surgery--unless otherwise directed by surgeon.  Hold Naproxen (Aleve) for 4 days before surgery.    -  DO NOT take these medications the day of surgery:  Chlorthalidone (Hygroten), Lisinopril, Glipizide (Glucotrol XL), Metformin, Miralax    -  PLEASE TAKE these medications the day of surgery:  Atenolol, Pravastatin  Oxycodone if needed      Omeprazole if needed  Acetaminophen  (Tylenol) if needed    How do I prepare myself?  - Please take 2 showers before surgery using Scrubcare or Hibiclens soap.    Use this soap only from the neck to your toes.     Leave the soap on your skin for one minute--then rinse thoroughly.      You may use your own shampoo and conditioner; no other hair products.   - Please remove all jewelry and body piercings.  - No lotions, deodorants or fragrance.  - Bring your ID and insurance card.    ALL PATIENTS GOING HOME THE SAME DAY OF SURGERY ARE REQUIRED TO HAVE A RESPONSIBLE ADULT TO DRIVE AND BE IN ATTENDANCE WITH THEM FOR 24 HOURS FOLLOWING SURGERY     Please see your Primary Care Provider for 2 skin lesions check.    Questions or Concerns:    - For any questions regarding the day of surgery or your hospital stay, please contact the Pre Admission Nursing Office at [phone].       - If you have health changes between today and your surgery please call your surgeon.       For questions after surgery please call your surgeons office.     AFTER YOUR SURGERY  Breathing exercises   Breathing exercises help you recover faster. Take deep breaths and let the air out slowly. This will:     Help you wake up after surgery.    Help prevent complications like pneumonia.  Preventing complications will help you go home sooner.   We may give you a breathing device (incentive spirometer) to encourage you to breathe deeply.   Nausea and vomiting   You may feel sick to your stomach after surgery; if so, let your nurse know.    Pain control:  After surgery, you may have pain. Our goal is to help you manage your pain. Pain medicine will help you feel comfortable enough to do activities that will help you heal.  These activities may include breathing exercises, walking and physical therapy.   To help your health care team treat your pain we will ask: 1) If you have pain  2) where it is located 3) describe your pain in your words  Methods of pain control include medications given by  mouth, vein or by nerve block for some surgeries.  Sequential Compression Device (SCD):  You may need to wear SCD S (also called pneumo boots)on your legs or feet. These are wraps connected to a machine that pumps in air and releases it. The repeated pumping helps prevent blood clots from forming.

## 2020-07-24 NOTE — H&P
Pre-Operative H & P     CC:  Preoperative exam to assess for increased cardiopulmonary risk while undergoing surgery and anesthesia.    Date of Encounter: 7/24/2020  Primary Care Physician:  Yamileth Allan  Associated diagnosis: hepatocellular carcinoma    HPI  Jerome Schoen is a 59 year old male who presents for pre-operative H & P in preparation for an ANESTHESIA OUT OF OR liver ablation clissic case on 7/29/20 by Dr. Sheth at [facility].     Jerome Schoen is a 59 year old male with hypertension, hyperlipidemia, aortic root dilation, tobacco use, thrombocytopenia, diabetes, GERD, esophageal varices, history of hepatitis C and alcohol dependence in remission that has hepatocellular carcinoma.  He reports alcohol dependence in remission x 9 year now except for an occasional beer.  He was diagnosed about a year and a half ago with hepatitis C and cirrhosis.  He was treated for hep C in 2019 and cleared the virus.  He had a CT scan for follow up of the cirrhosis in June 2019 and was found to have a concerning 1.8cm liver lesion (LI-RADS 3).  Repeat imaging his year for further follow up showed the same size lesion, but now noted it as LI-RADS 5.  He was subsequently referred to Dr. Sheth for treatment consultation.  The above listed procedure has now been recommended.      History is obtained from the patient and the medical record.     Past Medical History  Past Medical History:   Diagnosis Date     Cirrhosis (H)      Continuous tobacco abuse      DM type 2 (diabetes mellitus, type 2) (H)      HCC (hepatocellular carcinoma) (H)      HCV (hepatitis C virus)      HTN (hypertension)        Past Surgical History  Past Surgical History:   Procedure Laterality Date     APPENDECTOMY       ARTHROSCOPY KNEE BILATERAL       CHOLECYSTECTOMY         Hx of Blood transfusions/reactions: none    Hx of abnormal bleeding or anti-platelet use: none      Steroid use in the last year:  short course of prednisone nov 2019 for knee pain.  No long term steroids.      Personal or FH with difficulty with Anesthesia:  none    Prior to Admission Medications  Current Outpatient Medications   Medication Sig Dispense Refill     APPLE CIDER VINEGAR PO Take 1 capsule by mouth as needed       atenolol (TENORMIN) 100 MG tablet Take 100 mg by mouth every morning        chlorthalidone (HYGROTON) 25 MG tablet Take 25 mg by mouth every morning        glipiZIDE (GLUCOTROL XL) 10 MG 24 hr tablet Take 10 mg by mouth every morning        LANsoprazole (PREVACID) 30 MG DR capsule Take 30 mg by mouth as needed        lisinopril (ZESTRIL) 20 MG tablet Take 40 mg by mouth every morning        metFORMIN (GLUCOPHAGE) 500 MG tablet Take 500 mg by mouth every morning        OMEPRAZOLE PO        oxyCODONE IR (ROXICODONE) 10 MG tablet Take 5 mg by mouth as needed for severe pain        polyethylene glycol (MIRALAX) 17 GM/SCOOP powder Take 1 capful by mouth as needed          Allergies  No Known Allergies    Social History  Social History     Socioeconomic History     Marital status:      Spouse name: Not on file     Number of children: 1     Years of education: Not on file     Highest education level: Not on file   Occupational History     Occupation:    Social Needs     Financial resource strain: Not on file     Food insecurity     Worry: Not on file     Inability: Not on file     Transportation needs     Medical: Not on file     Non-medical: Not on file   Tobacco Use     Smoking status: Current Every Day Smoker     Packs/day: 1.00     Years: 46.00     Pack years: 46.00     Types: Cigarettes     Smokeless tobacco: Never Used   Substance and Sexual Activity     Alcohol use: Yes     Comment: 1 beer every couple months     Drug use: Not Currently     Sexual activity: Not on file   Lifestyle     Physical activity     Days per week: Not on file     Minutes per session: Not on file     Stress: Not on file    Relationships     Social connections     Talks on phone: Not on file     Gets together: Not on file     Attends Voodoo service: Not on file     Active member of club or organization: Not on file     Attends meetings of clubs or organizations: Not on file     Relationship status: Not on file     Intimate partner violence     Fear of current or ex partner: Not on file     Emotionally abused: Not on file     Physically abused: Not on file     Forced sexual activity: Not on file   Other Topics Concern     Not on file   Social History Narrative     Not on file       Family History  Family History   Problem Relation Age of Onset     Cerebrovascular Disease Mother      Myocardial Infarction Mother      Other - See Comments Father          from trauma in his 50s     Alcoholism Father      Diabetes Sister      Heart Disease Sister      Coronary Artery Disease Sister      Alcoholism Sister      Other - See Comments Half-Brother          at age 21 from drowning     Seizure Disorder Half-Brother                  ROS/MED HX  The complete review of systems is negative other than noted in the HPI or here.   ENT/Pulmonary:     (+)CAITLIN risk factors snores loudly, hypertension, tobacco use, Current use 1 packs/day  , . .   (-) asthma, COPD and recent URI   Neurologic:     (+)neuropathy - left foot,     Cardiovascular: Comment: Mild aortic dilation 4.0cm    (+) Dyslipidemia, hypertension----. : . . . :. . Previous cardiac testing Echodate:results:Stress Testdate: results: date: results: date: results:         (-) taking anticoagulants/antiplatelets   METS/Exercise Tolerance:  >4 METS   Hematologic:     (+) Other Hematologic Disorder-thrombocytopenia     (-) history of blood clots and History of Transfusion   Musculoskeletal:   (+) arthritis,  other musculoskeletal- bilateral knee pain, low back pain      GI/Hepatic:     (+) GERD Asymptomatic on medication, hepatitis type C, liver disease, esophageal varices     "  Renal/Genitourinary:  - ROS Renal section negative       Endo:     (+) type II DM date: 7/24/20 Not using insulin - not using insulin pump Normal glucose range: unknown not previously admitted for DM/DKA Diabetic complications: neuropathy, Obesity, .      Psychiatric:     (+) psychiatric history other (comment) (alcohol dependence in remission)      Infectious Disease:  - neg infectious disease ROS      (-) Recent Fever   Malignancy:   (+) Malignancy History of Other  Other CA hepatocellular cancer Active status post         Other:    (+) H/O Chronic Pain,H/O chronic opiod use ,                        PHYSICAL EXAM:   Mental Status/Neuro: A/A/O; Age Appropriate   Airway: Facies: Feasible  Mallampati: I  Mouth/Opening: Full  TM distance: > 6 cm  Neck ROM: Full   Respiratory: Auscultation: CTAB     Resp. Rate: Normal     Resp. Effort: Normal      CV: Rhythm: Regular  Rate: Age appropriate  Heart: Normal Sounds  Edema: None   Comments:      Dental: Dentures  Dentures: Upper; Partial Habitus: Obesity                 Temp: 98.4  F (36.9  C) Temp src: Oral BP: (!) 156/95 Pulse: 64   Resp: 20 SpO2: 99 %         Repeat blood pressure 135/89     199 lbs 0 oz  5' 8\"[Pt reported[   Body mass index is 30.26 kg/m .       Physical Exam  Constitutional: Awake, alert, cooperative, no apparent distress, and appears stated age. obese  Eyes: Pupils equal, round and reactive to light, extra ocular muscles intact, sclera clear, conjunctiva normal.  HENT: Normocephalic, oral pharynx with moist mucus membranes. Dentition - upper partial.  No goiter appreciated.   Respiratory: Clear to auscultation bilaterally, no crackles or wheezing.  Cardiovascular: Regular rate and rhythm, normal S1 and S2, and no murmur noted.  Carotids +2, no bruits. No edema. Palpable pulses to radial  DP and PT arteries.   GI: Normal bowel sounds, soft, non-distended, non-tender, no masses palpated, no hepatosplenomegaly.    Lymph/Hematologic: No cervical " lymphadenopathy and no supraclavicular lymphadenopathy.  Genitourinary:  deferred  Skin: Warm and dry.    Musculoskeletal: Full ROM of neck. There is no redness, warmth, or swelling of the exposed joints. Gross motor strength is normal.    Neurologic: Awake, alert, oriented to name, place and time. Cranial nerves II-XII are grossly intact. Gait is normal.   Neuropsychiatric: Calm, cooperative. Normal affect.     Labs: (personally reviewed)   Component      Latest Ref Rng & Units 2020   Sodium      133 - 144 mmol/L 131 (L)   Potassium      3.4 - 5.3 mmol/L 3.5   Chloride      94 - 109 mmol/L 97   Carbon Dioxide      20 - 32 mmol/L 30   Anion Gap      3 - 14 mmol/L 4   Glucose      70 - 99 mg/dL 237 (H)   Urea Nitrogen      7 - 30 mg/dL 12   Creatinine      0.66 - 1.25 mg/dL 0.66   GFR Estimate      >60 mL/min/1.73:m2 >90   GFR Estimate If Black      >60 mL/min/1.73:m2 >90   Calcium      8.5 - 10.1 mg/dL 8.8   Bilirubin Total      0.2 - 1.3 mg/dL 0.5   Albumin      3.4 - 5.0 g/dL 3.7   Protein Total      6.8 - 8.8 g/dL 7.3   Alkaline Phosphatase      40 - 150 U/L 82   ALT      0 - 70 U/L 32   AST      0 - 45 U/L 14   WBC      4.0 - 11.0 10e9/L 7.3   RBC Count      4.4 - 5.9 10e12/L 4.99   Hemoglobin      13.3 - 17.7 g/dL 14.7   Hematocrit      40.0 - 53.0 % 43.3   MCV      78 - 100 fl 87   MCH      26.5 - 33.0 pg 29.5   MCHC      31.5 - 36.5 g/dL 33.9   RDW      10.0 - 15.0 % 13.2   Platelet Count      150 - 450 10e9/L 74 (L)   N-Terminal Pro Bnp      0 - 125 pg/mL 80   Hemoglobin A1C      0 - 5.6 % 6.9 (H)   INR      0.86 - 1.14 1.10           [id number]  Sinus rhythm  Left axis deviation  Septal infarct , possible  Inferior infarct , possible  Abnormal ECG  No previous ECGs available      Cardiac echo:   10/16/2019  CONCLUSIONS  Left ventricular ejection fraction is visually estimated at 60%.  No significant valvular abnormalities were identified.  Mild dilation of the aorta is present involving the sinuses  of  Valsalva. (Maximal dimension 4.0 cm).      Stress test:   11/4/2019  CONCLUSIONS  REST:  LV chamber size, wall thickness, and segmental and global wall motion  are normal. No significant valvular abnormalities are seen.  The visually estimated resting LVEF is 55 %.    STRESS:  Non diagnostic stress echo due to low HR achieved (52% of maximal  predicted heart rate)  Consider Lexiscan nuclear stress test if clinically indicated.      REST   ECG   Normal sinus rhythm.   Resting HR:63 bpmResting BP:110/78 mmHg   Pre-Stress Physical Exam   Current patient medications that may affect electrocardiographic   interpretation.: Atenolol, taken on the day of the test .    STRESS   Stress Type: Bike Ergometer   Peak HR: 84 bpm                               HR Response: Normal   Peak BP: 128/88 mmHg                          BP Response: Normal   Max Predicted HR: 161 bpm                     HR BP Product: 66205   % of Max Predicted HR: 52                     Max Exercise: 3.1 METS   Test Duration: 5.15 min   Reason for Termination: Musculoskeletal       Exercise Effort: Poor   fatigue   Risk Stratification: Unavailable   Stress Interpretation   ECG portion is non-diagnostic due to inability to reach target HR .      Outside records reviewed from: Care Everywhere        ASSESSMENT and PLAN  Jerome Schoen is a 59 year old male scheduled for an ANESTHESIA OUT OF OR liver ablation clissic case on 7/29/20 by Dr. Sheth in treatment of hepatocellular carcinoma.  PAC referral for risk assessment and optimization for anesthesia with comorbid conditions of: hypertension, hyperlipidemia, aortic root dilation, tobacco use, thrombocytopenia, diabetes, GERD, esophageal varices, history of hepatitis C and alcohol dependence in remission.      Pre-operative considerations:  1.  Cardiac:  Functional status- METS >4.  He doesn't purposefully exercise,but reports he is active with pushing, pulling, lifting,etc in his  job and  can walk several blocks with no complication.  He had an echocardiogram on 10/16/2019 that showed an EF of 60% and mild dilation of the aorta.  He had a stress test in November 2019 that was listed as non-diagnostic due to inability to reach target HR, but there were no wall motion abnormalities and he achieved 52% of target HR.  Hold lisinopril and clorthalidone DOS.  Intermediate risk surgery with 0.4% risk of major adverse cardiac event.   2.  Pulm:  Airway feasible.  CAITLIN risk: intermediate .  He is a current smoker.    3.  GI:  Risk of PONV score = **.  If > 2, anti-emetic intervention recommended.  GERD is well controlled with omeprazole.  Hep C cured in 2019.    4. Endo:  Diabetes is managed with metformin and glipizide.  Hold both DOS.  He is obese with a BMI >30.   5. Psych:  Alcohol dependence essentially remission since 2011.  He reports he stopped drinking heavily then, but will still have a beer periodically.    6. Heme:  + chronic thrombocytopenia.  Recent platelet count 66.   7. Skin:  Noted lesions on his right lower leg and left shoulder that are concerning.  Offered referral to dermatology for eval, but he declined.  He reported that he will see his primary care provider at home for eval.  Encouraged him to do so as soon as he is able.      VTE risk: 3%    Patient is optimized and is acceptable candidate for the proposed procedure.  No further diagnostic evaluation is needed.         Toña Corona DNP, RN, APRN  Preoperative Assessment Center  [facility]  Phone: [phone]  Fax: [phone]

## 2020-07-24 NOTE — ANESTHESIA PREPROCEDURE EVALUATION
Anesthesia Pre-Procedure Evaluation    Patient: Jerome Schoen   MRN:     [id number] Gender:   male   Age:    59 year old :      1960        Preoperative Diagnosis: Hepatocellular carcinoma (H) [C22.0]   Procedure(s):  ANESTHESIA OUT OF OR liver ablation clissic case @12:30     LABS:  CBC: No results found for: WBC, HGB, HCT, PLT  BMP:   Lab Results   Component Value Date    POTASSIUM 4.0 2020    CR 0.6 (L) 2020     (H) 2020     COAGS:   Lab Results   Component Value Date    INR 1.1 2019     POC: No results found for: BGM, HCG, HCGS  OTHER:   Lab Results   Component Value Date    A1C 7.7 (H) 2020    ALT 35 2020    AST 22 2020    TSH 1.40 2019        Preop Vitals    BP Readings from Last 3 Encounters:   No data found for BP    Pulse Readings from Last 3 Encounters:   No data found for Pulse      Resp Readings from Last 3 Encounters:   No data found for Resp    SpO2 Readings from Last 3 Encounters:   No data found for SpO2      Temp Readings from Last 1 Encounters:   No data found for Temp    Ht Readings from Last 1 Encounters:   No data found for Ht      Wt Readings from Last 1 Encounters:   No data found for Wt    There is no height or weight on file to calculate BMI.     LDA:        Past Medical History:   Diagnosis Date     Cirrhosis (H)      Continuous tobacco abuse      DM type 2 (diabetes mellitus, type 2) (H)      HCC (hepatocellular carcinoma) (H)      HCV (hepatitis C virus)      HTN (hypertension)       No past surgical history on file.   No Known Allergies     Anesthesia Evaluation     . Pt has had prior anesthetic. Type: General and Regional    No history of anesthetic complications          ROS/MED HX    ENT/Pulmonary:     (+)CAITLIN risk factors snores loudly, hypertension, tobacco use, Current use 1 packs/day  , . .   (-) asthma, COPD and recent URI   Neurologic:     (+)neuropathy - left foot,     Cardiovascular: Comment: Mild aortic dilation  4.0cm    (+) Dyslipidemia, hypertension----. : . . . :. . Previous cardiac testing Echodate:10/2019results:CONCLUSIONS  Left ventricular ejection fraction is visually estimated at 60%.  No significant valvular abnormalities were identified.  Mild dilation of the aorta is present involving the sinuses of  Valsalva. (Maximal dimension 4.0 cm).Stress Testdate:11/2019 results:CONCLUSIONS  REST:  LV chamber size, wall thickness, and segmental and global wall motion  are normal. No significant valvular abnormalities are seen.  The visually estimated resting LVEF is 55 %.    STRESS:  Non diagnostic stress echo due to low HR achieved (52% of maximal  predicted heart rate)  Consider Lexiscan nuclear stress test if clinically indicated.      REST   ECG   Normal sinus rhythm.   Resting HR:63 bpmResting BP:110/78 mmHg   Pre-Stress Physical Exam   Current patient medications that may affect electrocardiographic   interpretation.: Atenolol, taken on the day of the test .    STRESS   Stress Type: Bike Ergometer   Peak HR: 84 bpm                               HR Response: Normal   Peak BP: 128/88 mmHg                          BP Response: Normal   Max Predicted HR: 161 bpm                     HR BP Product: 53363   % of Max Predicted HR: 52                     Max Exercise: 3.1 METS   Test Duration: 5.15 min   Reason for Termination: Musculoskeletal       Exercise Effort: Poor   fatigue   Risk Stratification: Unavailable   Stress Interpretation   ECG portion is non-diagnostic due to inability to reach target HR .ECG reviewed date:11/2019 results:Sinus rhythm  Left axis deviation  Septal infarct , possible  Inferior infarct , possible  Abnormal ECG  No previous ECGs available date: results:         (-) taking anticoagulants/antiplatelets   METS/Exercise Tolerance:  >4 METS   Hematologic:     (+) Other Hematologic Disorder-thrombocytopenia     (-) history of blood clots and History of Transfusion   Musculoskeletal:   (+) arthritis,   other musculoskeletal- bilateral knee pain, low back pain      GI/Hepatic:     (+) GERD Asymptomatic on medication, hepatitis type C, liver disease, esophageal varices      Renal/Genitourinary:  - ROS Renal section negative       Endo:     (+) type II DM Last HgA1c: 6.9 date: 7/24/20 Not using insulin - not using insulin pump Normal glucose range: unknown not previously admitted for DM/DKA Diabetic complications: neuropathy, Obesity, .      Psychiatric:     (+) psychiatric history other (comment) (alcohol dependence in remission)      Infectious Disease:  - neg infectious disease ROS      (-) Recent Fever   Malignancy:   (+) Malignancy History of Other  Other CA hepatocellular cancer Active status post         Other:    (+) H/O Chronic Pain,H/O chronic opiod use ,                        PHYSICAL EXAM:   Mental Status/Neuro: A/A/O; Age Appropriate   Airway: Facies: Feasible  Mallampati: I  Mouth/Opening: Full  TM distance: > 6 cm  Neck ROM: Full   Respiratory: Auscultation: CTAB     Resp. Rate: Normal     Resp. Effort: Normal      CV: Rhythm: Regular  Rate: Age appropriate  Heart: Normal Sounds  Edema: None   Comments:      Dental: Dentures  Dentures: Upper; Partial Habitus: Obesity               Assessment:   ASA SCORE: 3    H&P: History and physical reviewed and following examination; no interval change.    NPO Status: NPO Appropriate     Plan:   Anes. Type:  General   Pre-Medication: None   Induction:  IV (Standard)   Airway: ETT; Oral   Access/Monitoring: PIV; 2nd PIV   Maintenance: Balanced     Postop Plan:   Postop Pain: Opioids  Postop Sedation/Airway: Not planned     PONV Management:   Adult Risk Factors:, Postop Opioids   Prevention: Ondansetron, Dexamethasone     CONSENT: Direct conversation   Plan and risks discussed with: Patient   Blood Products: Refusal (SOME/ALL Blood Products)  Blood Product Refusal: Patient refuses blood products even in the event of impending death without blood products.  He has  signed the refusal forms.  Willing to accept: None                PAC Discussion and Assessment    ASA Classification: 3  Case is suitable for: [place]  Anesthetic techniques and relevant risks discussed: GA  Invasive monitoring and risk discussed:   Types:   Possibility and Risk of blood transfusion discussed:   NPO instructions given:   Additional anesthetic preparation and risks discussed:   Needs early admission to pre-op area:   Other:     PAC Resident/NP Anesthesia Assessment:  Jerome Schoen is a 59 year old male scheduled for an ANESTHESIA OUT OF OR liver ablation clissic case on 7/29/20 by Dr. Sheth in treatment of hepatocellular carcinoma.  PAC referral for risk assessment and optimization for anesthesia with comorbid conditions of: hypertension, hyperlipidemia, aortic root dilation, tobacco use, thrombocytopenia, diabetes, GERD, esophageal varices, history of hepatitis C and alcohol dependence in remission.      Pre-operative considerations:  1.  Cardiac:  Functional status- METS >4.  He doesn't purposefully exercise,but reports he is active with pushing, pulling, lifting,etc in his  job and can walk several blocks with no complication.  He had an echocardiogram on 10/16/2019 that showed an EF of 60% and mild dilation of the aorta.  He had a stress test in November 2019 that was listed as non-diagnostic due to inability to reach target HR, but there were no wall motion abnormalities and he achieved 52% of target HR.  Hold lisinopril and clorthalidone DOS.  Intermediate risk surgery with 0.4% risk of major adverse cardiac event.   2.  Pulm:  Airway feasible.  CAITLIN risk: intermediate .  He is a current smoker.    3.  GI:  Risk of PONV score = **.  If > 2, anti-emetic intervention recommended.  GERD is well controlled with omeprazole.  Hep C cured in 2019.    4. Endo:  Diabetes is managed with metformin and glipizide.  Hold both DOS.  He is obese with a BMI >30.   5. Psych:  Alcohol  dependence essentially remission since 2011.  He reports he stopped drinking heavily then, but will still have a beer periodically.    6. Heme:  + chronic thrombocytopenia.  Platelet count today is 74.   7. Skin:  Noted lesions on his right lower leg and left shoulder that are concerning.  Offered referral to dermatology for eval, but he declined.  He reported that he will see his primary care provider at home for eval.  Encouraged him to do so as soon as he is able.    8. FEN:  Na+ today is slightly low at 131.  Will order repeat for DOS.      VTE risk: 3%    Patient is optimized and is acceptable candidate for the proposed procedure.  No further diagnostic evaluation is needed.     **For further details of assessment, testing, and physical exam please see H and P completed on same date.          Toña Corona DNP, RN, APRN      Reviewed and Signed by PAC Mid-Level Provider/Resident  Mid-Level Provider/Resident: Toña Corona DNP, RN, APRN  Date: 7/24/20  Time: 0851    Attending Anesthesiologist Anesthesia Assessment:        Anesthesiologist:   Date:   Time:   Pass/Fail:   Disposition:     PAC Pharmacist Assessment:        Pharmacist:   Date:   Time:    JAYME Pereira CNP

## 2020-07-24 NOTE — LETTER
July 27, 2020      Jerome Schoen  [address] [place] [id number]        Dear Mr.Schoen,    We are writing to inform you of your test result.  Your blood sugar is elevated and sodium low.      Resulted Orders   CBC with platelets   Result Value Ref Range    WBC 7.3 4.0 - 11.0 10e9/L    RBC Count 4.99 4.4 - 5.9 10e12/L    Hemoglobin 14.7 13.3 - 17.7 g/dL    Hematocrit 43.3 40.0 - 53.0 %    MCV 87 78 - 100 fl    MCH 29.5 26.5 - 33.0 pg    MCHC 33.9 31.5 - 36.5 g/dL    RDW 13.2 10.0 - 15.0 %    Platelet Count 74 (L) 150 - 450 10e9/L   Comprehensive metabolic panel   Result Value Ref Range    Sodium 131 (L) 133 - 144 mmol/L    Potassium 3.5 3.4 - 5.3 mmol/L    Chloride 97 94 - 109 mmol/L    Carbon Dioxide 30 20 - 32 mmol/L    Anion Gap 4 3 - 14 mmol/L    Glucose 237 (H) 70 - 99 mg/dL    Urea Nitrogen 12 7 - 30 mg/dL    Creatinine 0.66 0.66 - 1.25 mg/dL    GFR Estimate >90 >60 mL/min/[1.73_m2]      Comment:      Non  GFR Calc  Starting 12/18/2018, serum creatinine based estimated GFR (eGFR) will be   calculated using the Chronic Kidney Disease Epidemiology Collaboration   (CKD-EPI) equation.      GFR Estimate If Black >90 >60 mL/min/[1.73_m2]      Comment:       GFR Calc  Starting 12/18/2018, serum creatinine based estimated GFR (eGFR) will be   calculated using the Chronic Kidney Disease Epidemiology Collaboration   (CKD-EPI) equation.      Calcium 8.8 8.5 - 10.1 mg/dL    Bilirubin Total 0.5 0.2 - 1.3 mg/dL    Albumin 3.7 3.4 - 5.0 g/dL    Protein Total 7.3 6.8 - 8.8 g/dL    Alkaline Phosphatase 82 40 - 150 U/L    ALT 32 0 - 70 U/L    AST 14 0 - 45 U/L     It was a pleasure to see you at your recent visit. Please let me know if you have any questions or concerns.     Clinic Staff - [phone] option 3     Sincerely,     Lupe Mae MD  [address] [facility], [id number]  [place]  [address].

## 2020-07-27 ENCOUNTER — MEDICAL CORRESPONDENCE (OUTPATIENT)
Dept: HEALTH INFORMATION MANAGEMENT | Facility: CLINIC | Age: 60
End: 2020-07-27

## 2020-07-29 ENCOUNTER — HOSPITAL ENCOUNTER (OUTPATIENT)
Facility: CLINIC | Age: 60
Discharge: HOME | End: 2020-07-29
Attending: RADIOLOGY | Admitting: RADIOLOGY
Payer: COMMERCIAL

## 2020-07-29 ENCOUNTER — ANESTHESIA (OUTPATIENT)
Dept: SURGERY | Facility: CLINIC | Age: 60
End: 2020-07-29
Payer: COMMERCIAL

## 2020-07-29 ENCOUNTER — APPOINTMENT (OUTPATIENT)
Dept: INTERVENTIONAL RADIOLOGY/VASCULAR | Facility: CLINIC | Age: 60
End: 2020-07-29
Attending: RADIOLOGY
Payer: COMMERCIAL

## 2020-07-29 VITALS
OXYGEN SATURATION: 99 % | SYSTOLIC BLOOD PRESSURE: 124 MMHG | TEMPERATURE: 97.6 F | RESPIRATION RATE: 12 BRPM | HEART RATE: 62 BPM | BODY MASS INDEX: 29.64 KG/M2 | WEIGHT: 195.55 LBS | DIASTOLIC BLOOD PRESSURE: 82 MMHG | HEIGHT: 68 IN

## 2020-07-29 DIAGNOSIS — C22.0 HCC (HEPATOCELLULAR CARCINOMA) (H): ICD-10-CM

## 2020-07-29 LAB
ABO + RH BLD: NORMAL
ABO + RH BLD: NORMAL
ALBUMIN SERPL-MCNC: 3.6 G/DL (ref 3.4–5)
ALP SERPL-CCNC: 87 U/L (ref 40–150)
ALT SERPL W P-5'-P-CCNC: 27 U/L (ref 0–70)
ANION GAP SERPL CALCULATED.3IONS-SCNC: 1 MMOL/L (ref 3–14)
APTT PPP: 31 SEC (ref 22–37)
AST SERPL W P-5'-P-CCNC: 18 U/L (ref 0–45)
BILIRUB DIRECT SERPL-MCNC: 0.2 MG/DL (ref 0–0.2)
BILIRUB SERPL-MCNC: 1 MG/DL (ref 0.2–1.3)
BLD GP AB SCN SERPL QL: NORMAL
BLOOD BANK CMNT PATIENT-IMP: NORMAL
BUN SERPL-MCNC: 11 MG/DL (ref 7–30)
CALCIUM SERPL-MCNC: 8.9 MG/DL (ref 8.5–10.1)
CHLORIDE SERPL-SCNC: 103 MMOL/L (ref 94–109)
CO2 SERPL-SCNC: 32 MMOL/L (ref 20–32)
CREAT SERPL-MCNC: 0.61 MG/DL (ref 0.66–1.25)
ERYTHROCYTE [DISTWIDTH] IN BLOOD BY AUTOMATED COUNT: 12.9 % (ref 10–15)
GFR SERPL CREATININE-BSD FRML MDRD: >90 ML/MIN/{1.73_M2}
GLUCOSE BLDC GLUCOMTR-MCNC: 174 MG/DL (ref 70–99)
GLUCOSE SERPL-MCNC: 150 MG/DL (ref 70–99)
HCT VFR BLD AUTO: 44.4 % (ref 40–53)
HGB BLD-MCNC: 15.2 G/DL (ref 13.3–17.7)
INR PPP: 1.13 (ref 0.86–1.14)
MCH RBC QN AUTO: 29.4 PG (ref 26.5–33)
MCHC RBC AUTO-ENTMCNC: 34.2 G/DL (ref 31.5–36.5)
MCV RBC AUTO: 86 FL (ref 78–100)
PLATELET # BLD AUTO: 64 10E9/L (ref 150–450)
POTASSIUM SERPL-SCNC: 3.4 MMOL/L (ref 3.4–5.3)
PROT SERPL-MCNC: 7.5 G/DL (ref 6.8–8.8)
RBC # BLD AUTO: 5.17 10E12/L (ref 4.4–5.9)
SODIUM SERPL-SCNC: 136 MMOL/L (ref 133–144)
SPECIMEN EXP DATE BLD: NORMAL
WBC # BLD AUTO: 5.8 10E9/L (ref 4–11)

## 2020-07-29 PROCEDURE — 25000125 ZZHC RX 250: Performed by: NURSE ANESTHETIST, CERTIFIED REGISTERED

## 2020-07-29 PROCEDURE — C1887 CATHETER, GUIDING: HCPCS

## 2020-07-29 PROCEDURE — 71000015 ZZH RECOVERY PHASE 1 LEVEL 2 EA ADDTL HR

## 2020-07-29 PROCEDURE — 40000065 ZZH STATISTIC EKG NON-CHARGEABLE

## 2020-07-29 PROCEDURE — 40000170 ZZH STATISTIC PRE-PROCEDURE ASSESSMENT II

## 2020-07-29 PROCEDURE — 27210889 ZZH ACCESSORY CR8

## 2020-07-29 PROCEDURE — 25000125 ZZHC RX 250: Performed by: STUDENT IN AN ORGANIZED HEALTH CARE EDUCATION/TRAINING PROGRAM

## 2020-07-29 PROCEDURE — 27210907 ZZH KIT CR9

## 2020-07-29 PROCEDURE — 25000128 H RX IP 250 OP 636: Performed by: STUDENT IN AN ORGANIZED HEALTH CARE EDUCATION/TRAINING PROGRAM

## 2020-07-29 PROCEDURE — 85730 THROMBOPLASTIN TIME PARTIAL: CPT | Performed by: RADIOLOGY

## 2020-07-29 PROCEDURE — 82962 GLUCOSE BLOOD TEST: CPT

## 2020-07-29 PROCEDURE — 36247 INS CATH ABD/L-EXT ART 3RD: CPT

## 2020-07-29 PROCEDURE — 36415 COLL VENOUS BLD VENIPUNCTURE: CPT | Performed by: NURSE PRACTITIONER

## 2020-07-29 PROCEDURE — 85027 COMPLETE CBC AUTOMATED: CPT | Performed by: RADIOLOGY

## 2020-07-29 PROCEDURE — 27210888 ZZH ACCESSORY CR7

## 2020-07-29 PROCEDURE — 85610 PROTHROMBIN TIME: CPT | Performed by: RADIOLOGY

## 2020-07-29 PROCEDURE — 77013 CT GUIDE FOR TISSUE ABLATION: CPT

## 2020-07-29 PROCEDURE — 27210780 ZZH KIT CR3

## 2020-07-29 PROCEDURE — 27810149 ZZH COIL/EMBOLIC DEVICE CR12

## 2020-07-29 PROCEDURE — 25500064 ZZH RX 255 OP 636: Performed by: RADIOLOGY

## 2020-07-29 PROCEDURE — 27210804 ZZH SHEATH CR3

## 2020-07-29 PROCEDURE — 71000014 ZZH RECOVERY PHASE 1 LEVEL 2 FIRST HR

## 2020-07-29 PROCEDURE — 25000125 ZZHC RX 250: Performed by: RADIOLOGY

## 2020-07-29 PROCEDURE — C1760 CLOSURE DEV, VASC: HCPCS

## 2020-07-29 PROCEDURE — 25000128 H RX IP 250 OP 636: Performed by: RADIOLOGY

## 2020-07-29 PROCEDURE — 93010 ELECTROCARDIOGRAM REPORT: CPT | Mod: 59 | Performed by: INTERNAL MEDICINE

## 2020-07-29 PROCEDURE — C1769 GUIDE WIRE: HCPCS

## 2020-07-29 PROCEDURE — 37000008 ZZH ANESTHESIA TECHNICAL FEE, 1ST 30 MIN

## 2020-07-29 PROCEDURE — 27210908 ZZH NEEDLE CR4

## 2020-07-29 PROCEDURE — 25800030 ZZH RX IP 258 OP 636: Performed by: RADIOLOGY

## 2020-07-29 PROCEDURE — 25000566 ZZH SEVOFLURANE, EA 15 MIN

## 2020-07-29 PROCEDURE — 80076 HEPATIC FUNCTION PANEL: CPT | Performed by: NURSE PRACTITIONER

## 2020-07-29 PROCEDURE — 25000128 H RX IP 250 OP 636: Performed by: NURSE ANESTHETIST, CERTIFIED REGISTERED

## 2020-07-29 PROCEDURE — 74175 CTA ABDOMEN W/CONTRAST: CPT

## 2020-07-29 PROCEDURE — 75774 ARTERY X-RAY EACH VESSEL: CPT | Mod: XU

## 2020-07-29 PROCEDURE — 80048 BASIC METABOLIC PNL TOTAL CA: CPT | Performed by: NURSE PRACTITIONER

## 2020-07-29 PROCEDURE — 37000009 ZZH ANESTHESIA TECHNICAL FEE, EACH ADDTL 15 MIN

## 2020-07-29 PROCEDURE — 27210732 ZZH ACCESSORY CR1

## 2020-07-29 PROCEDURE — 75726 ARTERY X-RAYS ABDOMEN: CPT | Mod: XU

## 2020-07-29 PROCEDURE — 25800030 ZZH RX IP 258 OP 636: Performed by: STUDENT IN AN ORGANIZED HEALTH CARE EDUCATION/TRAINING PROGRAM

## 2020-07-29 PROCEDURE — 71000027 ZZH RECOVERY PHASE 2 EACH 15 MINS

## 2020-07-29 PROCEDURE — 36415 COLL VENOUS BLD VENIPUNCTURE: CPT | Performed by: RADIOLOGY

## 2020-07-29 RX ORDER — IODIXANOL 320 MG/ML
150 INJECTION, SOLUTION INTRAVASCULAR ONCE
Status: COMPLETED | OUTPATIENT
Start: 2020-07-29 | End: 2020-07-29

## 2020-07-29 RX ORDER — OXYCODONE HYDROCHLORIDE 5 MG/1
5 TABLET ORAL EVERY 6 HOURS PRN
Qty: 15 TABLET | Refills: 0 | Status: SHIPPED | OUTPATIENT
Start: 2020-07-29 | End: 2020-08-02

## 2020-07-29 RX ORDER — DEXTROSE MONOHYDRATE 25 G/50ML
25-50 INJECTION, SOLUTION INTRAVENOUS
Status: DISCONTINUED | OUTPATIENT
Start: 2020-07-29 | End: 2020-07-29 | Stop reason: HOSPADM

## 2020-07-29 RX ORDER — FENTANYL CITRATE 50 UG/ML
25-50 INJECTION, SOLUTION INTRAMUSCULAR; INTRAVENOUS
Status: DISCONTINUED | OUTPATIENT
Start: 2020-07-29 | End: 2020-07-29 | Stop reason: HOSPADM

## 2020-07-29 RX ORDER — LABETALOL HYDROCHLORIDE 5 MG/ML
10 INJECTION, SOLUTION INTRAVENOUS
Status: DISCONTINUED | OUTPATIENT
Start: 2020-07-29 | End: 2020-07-29 | Stop reason: HOSPADM

## 2020-07-29 RX ORDER — NICOTINE POLACRILEX 4 MG
15-30 LOZENGE BUCCAL
Status: DISCONTINUED | OUTPATIENT
Start: 2020-07-29 | End: 2020-07-29 | Stop reason: HOSPADM

## 2020-07-29 RX ORDER — SODIUM CHLORIDE, SODIUM LACTATE, POTASSIUM CHLORIDE, CALCIUM CHLORIDE 600; 310; 30; 20 MG/100ML; MG/100ML; MG/100ML; MG/100ML
INJECTION, SOLUTION INTRAVENOUS CONTINUOUS
Status: DISCONTINUED | OUTPATIENT
Start: 2020-07-29 | End: 2020-07-29 | Stop reason: HOSPADM

## 2020-07-29 RX ORDER — AMPICILLIN AND SULBACTAM 2; 1 G/1; G/1
3 INJECTION, POWDER, FOR SOLUTION INTRAMUSCULAR; INTRAVENOUS
Status: COMPLETED | OUTPATIENT
Start: 2020-07-29 | End: 2020-07-29

## 2020-07-29 RX ORDER — KETAMINE HYDROCHLORIDE 10 MG/ML
INJECTION INTRAMUSCULAR; INTRAVENOUS PRN
Status: DISCONTINUED | OUTPATIENT
Start: 2020-07-29 | End: 2020-07-29

## 2020-07-29 RX ORDER — MEPERIDINE HYDROCHLORIDE 25 MG/ML
12.5 INJECTION INTRAMUSCULAR; INTRAVENOUS; SUBCUTANEOUS
Status: DISCONTINUED | OUTPATIENT
Start: 2020-07-29 | End: 2020-07-29 | Stop reason: HOSPADM

## 2020-07-29 RX ORDER — ONDANSETRON 4 MG/1
4 TABLET, FILM COATED ORAL EVERY 6 HOURS PRN
Qty: 20 TABLET | Refills: 0 | Status: SHIPPED | OUTPATIENT
Start: 2020-07-29

## 2020-07-29 RX ORDER — ESMOLOL HYDROCHLORIDE 10 MG/ML
INJECTION INTRAVENOUS PRN
Status: DISCONTINUED | OUTPATIENT
Start: 2020-07-29 | End: 2020-07-29

## 2020-07-29 RX ORDER — LIDOCAINE 40 MG/G
CREAM TOPICAL
Status: DISCONTINUED | OUTPATIENT
Start: 2020-07-29 | End: 2020-07-29 | Stop reason: HOSPADM

## 2020-07-29 RX ORDER — FENTANYL CITRATE 50 UG/ML
INJECTION, SOLUTION INTRAMUSCULAR; INTRAVENOUS PRN
Status: DISCONTINUED | OUTPATIENT
Start: 2020-07-29 | End: 2020-07-29

## 2020-07-29 RX ORDER — EPHEDRINE SULFATE 50 MG/ML
INJECTION, SOLUTION INTRAMUSCULAR; INTRAVENOUS; SUBCUTANEOUS PRN
Status: DISCONTINUED | OUTPATIENT
Start: 2020-07-29 | End: 2020-07-29

## 2020-07-29 RX ORDER — SCOLOPAMINE TRANSDERMAL SYSTEM 1 MG/1
1 PATCH, EXTENDED RELEASE TRANSDERMAL ONCE
Status: DISCONTINUED | OUTPATIENT
Start: 2020-07-29 | End: 2020-07-29 | Stop reason: HOSPADM

## 2020-07-29 RX ORDER — ONDANSETRON 4 MG/1
4 TABLET, ORALLY DISINTEGRATING ORAL EVERY 30 MIN PRN
Status: DISCONTINUED | OUTPATIENT
Start: 2020-07-29 | End: 2020-07-29 | Stop reason: HOSPADM

## 2020-07-29 RX ORDER — SODIUM CHLORIDE 9 MG/ML
INJECTION, SOLUTION INTRAVENOUS CONTINUOUS
Status: DISCONTINUED | OUTPATIENT
Start: 2020-07-29 | End: 2020-07-29 | Stop reason: HOSPADM

## 2020-07-29 RX ORDER — SODIUM CHLORIDE, SODIUM LACTATE, POTASSIUM CHLORIDE, CALCIUM CHLORIDE 600; 310; 30; 20 MG/100ML; MG/100ML; MG/100ML; MG/100ML
INJECTION, SOLUTION INTRAVENOUS CONTINUOUS PRN
Status: DISCONTINUED | OUTPATIENT
Start: 2020-07-29 | End: 2020-07-29

## 2020-07-29 RX ORDER — DEXAMETHASONE SODIUM PHOSPHATE 4 MG/ML
INJECTION, SOLUTION INTRA-ARTICULAR; INTRALESIONAL; INTRAMUSCULAR; INTRAVENOUS; SOFT TISSUE PRN
Status: DISCONTINUED | OUTPATIENT
Start: 2020-07-29 | End: 2020-07-29

## 2020-07-29 RX ORDER — ONDANSETRON 2 MG/ML
4 INJECTION INTRAMUSCULAR; INTRAVENOUS ONCE
Status: DISCONTINUED | OUTPATIENT
Start: 2020-07-29 | End: 2020-07-29 | Stop reason: HOSPADM

## 2020-07-29 RX ORDER — ONDANSETRON 2 MG/ML
INJECTION INTRAMUSCULAR; INTRAVENOUS PRN
Status: DISCONTINUED | OUTPATIENT
Start: 2020-07-29 | End: 2020-07-29

## 2020-07-29 RX ORDER — ONDANSETRON 2 MG/ML
4 INJECTION INTRAMUSCULAR; INTRAVENOUS EVERY 30 MIN PRN
Status: DISCONTINUED | OUTPATIENT
Start: 2020-07-29 | End: 2020-07-29 | Stop reason: HOSPADM

## 2020-07-29 RX ORDER — HEPARIN SODIUM 200 [USP'U]/100ML
1 INJECTION, SOLUTION INTRAVENOUS CONTINUOUS PRN
Status: DISCONTINUED | OUTPATIENT
Start: 2020-07-29 | End: 2020-07-29 | Stop reason: HOSPADM

## 2020-07-29 RX ORDER — DEXMEDETOMIDINE HYDROCHLORIDE 4 UG/ML
0.2-1.2 INJECTION, SOLUTION INTRAVENOUS CONTINUOUS
Status: DISCONTINUED | OUTPATIENT
Start: 2020-07-29 | End: 2020-07-29 | Stop reason: HOSPADM

## 2020-07-29 RX ORDER — PROPOFOL 10 MG/ML
INJECTION, EMULSION INTRAVENOUS PRN
Status: DISCONTINUED | OUTPATIENT
Start: 2020-07-29 | End: 2020-07-29

## 2020-07-29 RX ORDER — NALOXONE HYDROCHLORIDE 0.4 MG/ML
.1-.4 INJECTION, SOLUTION INTRAMUSCULAR; INTRAVENOUS; SUBCUTANEOUS
Status: DISCONTINUED | OUTPATIENT
Start: 2020-07-29 | End: 2020-07-29 | Stop reason: HOSPADM

## 2020-07-29 RX ORDER — HYDROMORPHONE HYDROCHLORIDE 1 MG/ML
.3-.5 INJECTION, SOLUTION INTRAMUSCULAR; INTRAVENOUS; SUBCUTANEOUS EVERY 10 MIN PRN
Status: DISCONTINUED | OUTPATIENT
Start: 2020-07-29 | End: 2020-07-29 | Stop reason: HOSPADM

## 2020-07-29 RX ADMIN — SUGAMMADEX 200 MG: 100 INJECTION, SOLUTION INTRAVENOUS at 16:08

## 2020-07-29 RX ADMIN — MIDAZOLAM 2 MG: 1 INJECTION INTRAMUSCULAR; INTRAVENOUS at 13:23

## 2020-07-29 RX ADMIN — PROPOFOL 30 MG: 10 INJECTION, EMULSION INTRAVENOUS at 15:20

## 2020-07-29 RX ADMIN — AMPICILLIN SODIUM AND SULBACTAM SODIUM 3 G: 2; 1 INJECTION, POWDER, FOR SOLUTION INTRAMUSCULAR; INTRAVENOUS at 13:46

## 2020-07-29 RX ADMIN — HEPARIN SODIUM 2 BAG: 200 INJECTION, SOLUTION INTRAVENOUS at 16:22

## 2020-07-29 RX ADMIN — ONDANSETRON 4 MG: 2 INJECTION INTRAMUSCULAR; INTRAVENOUS at 15:15

## 2020-07-29 RX ADMIN — AMPICILLIN SODIUM AND SULBACTAM SODIUM 1.5 G: 2; 1 INJECTION, POWDER, FOR SOLUTION INTRAMUSCULAR; INTRAVENOUS at 15:46

## 2020-07-29 RX ADMIN — PHENYLEPHRINE HYDROCHLORIDE 100 MCG: 10 INJECTION INTRAVENOUS at 13:59

## 2020-07-29 RX ADMIN — HYDROMORPHONE HYDROCHLORIDE 0.5 MG: 1 INJECTION, SOLUTION INTRAMUSCULAR; INTRAVENOUS; SUBCUTANEOUS at 18:03

## 2020-07-29 RX ADMIN — PHENYLEPHRINE HYDROCHLORIDE 100 MCG: 10 INJECTION INTRAVENOUS at 14:12

## 2020-07-29 RX ADMIN — PHENYLEPHRINE HYDROCHLORIDE 100 MCG: 10 INJECTION INTRAVENOUS at 14:50

## 2020-07-29 RX ADMIN — PROPOFOL 200 MG: 10 INJECTION, EMULSION INTRAVENOUS at 13:23

## 2020-07-29 RX ADMIN — PHENYLEPHRINE HYDROCHLORIDE 100 MCG: 10 INJECTION INTRAVENOUS at 13:26

## 2020-07-29 RX ADMIN — DEXMEDETOMIDINE 0.5 MCG/KG/HR: 100 INJECTION, SOLUTION, CONCENTRATE INTRAVENOUS at 13:42

## 2020-07-29 RX ADMIN — IODIXANOL 120 ML: 320 INJECTION, SOLUTION INTRAVASCULAR at 16:16

## 2020-07-29 RX ADMIN — ESMOLOL HYDROCHLORIDE 20 MG: 10 INJECTION, SOLUTION INTRAVENOUS at 15:23

## 2020-07-29 RX ADMIN — DEXAMETHASONE SODIUM PHOSPHATE 4 MG: 4 INJECTION, SOLUTION INTRA-ARTICULAR; INTRALESIONAL; INTRAMUSCULAR; INTRAVENOUS; SOFT TISSUE at 13:26

## 2020-07-29 RX ADMIN — SODIUM CHLORIDE, POTASSIUM CHLORIDE, SODIUM LACTATE AND CALCIUM CHLORIDE: 600; 310; 30; 20 INJECTION, SOLUTION INTRAVENOUS at 13:20

## 2020-07-29 RX ADMIN — Medication 5 MG: at 14:06

## 2020-07-29 RX ADMIN — PROPOFOL 40 MG: 10 INJECTION, EMULSION INTRAVENOUS at 15:11

## 2020-07-29 RX ADMIN — PHENYLEPHRINE HYDROCHLORIDE 100 MCG: 10 INJECTION INTRAVENOUS at 13:55

## 2020-07-29 RX ADMIN — LIDOCAINE HYDROCHLORIDE 7 ML: 10 INJECTION, SOLUTION EPIDURAL; INFILTRATION; INTRACAUDAL; PERINEURAL at 16:20

## 2020-07-29 RX ADMIN — Medication 30 MG: at 13:23

## 2020-07-29 RX ADMIN — HYDROMORPHONE HYDROCHLORIDE 0.4 MG: 1 INJECTION, SOLUTION INTRAMUSCULAR; INTRAVENOUS; SUBCUTANEOUS at 17:27

## 2020-07-29 RX ADMIN — ROCURONIUM BROMIDE 60 MG: 10 INJECTION INTRAVENOUS at 13:23

## 2020-07-29 RX ADMIN — FENTANYL CITRATE 100 MCG: 50 INJECTION, SOLUTION INTRAMUSCULAR; INTRAVENOUS at 13:24

## 2020-07-29 ASSESSMENT — MIFFLIN-ST. JEOR: SCORE: 1671.5

## 2020-07-29 NOTE — ANESTHESIA CARE TRANSFER NOTE
Patient: Jerome Schoen    Procedure(s):  ANESTHESIA OUT OF OR liver ablation [employer] case @12:30    Diagnosis: Hepatocellular carcinoma (H) [C22.0]  Diagnosis Additional Information: No value filed.    Anesthesia Type:   General     Note:  Airway :Nasal Cannula  Patient transferred to:PACU  Comments: Awake with good patent airway.  VSSHandoff Report: Identifed the Patient, Identified the Reponsible Provider, Reviewed the pertinent medical history, Discussed the surgical course, Reviewed Intra-OP anesthesia mangement and issues during anesthesia, Set expectations for post-procedure period and Allowed opportunity for questions and acknowledgement of understanding      Vitals: (Last set prior to Anesthesia Care Transfer)    CRNA VITALS  7/29/2020 1547 - 7/29/2020 1631      7/29/2020             NIBP:  106/77    Pulse:  60    NIBP Mean:  86    Ht Rate:  59    SpO2:  100 %    Resp Rate (observed):  (!) 2                Electronically Signed By: JAYME Champagne CRNA  July 29, 2020  4:31 PM

## 2020-07-29 NOTE — IR NOTE
Patient Name: Jerome Schoen  Medical Record Number: [id number]  Today's Date: 7/29/2020    Procedure: image guided radio frequency liver ablation and transarterial chemo embolization  Proceduralist: Shira Monterroso     Procedure Start: 1355  Procedure end: 1620  Sedation medications administered: General Anesthesia administered by anesthesia staff     Report given to: PACU RN via anesthesia staff  : n/a    Other Notes: Pt arrived to IR room 1 from . Consent reviewed. Pt denies any questions or concerns regarding procedure. Pt positioned supine and monitored per protocol. Pt tolerated procedure without any noted complications. Pt transferred to PACU for recovery.

## 2020-07-29 NOTE — PROCEDURES
[facility], [place]    Procedure: IR Procedure Note    Date/Time: 7/29/2020 4:17 PM  Performed by: Jamal Velez MD  Authorized by: Jamal Velez MD     UNIVERSAL PROTOCOL   Site Marked: NA  Prior Images Obtained and Reviewed:  Yes  Required items: Required blood products, implants, devices and special equipment available    Patient identity confirmed:  Verbally with patient, arm band, provided demographic data and hospital-assigned identification number  Patient was reevaluated immediately before administering moderate or deep sedation or anesthesia  Confirmation Checklist:  Patient's identity using two indicators, relevant allergies, procedure was appropriate and matched the consent or emergent situation and correct equipment/implants were available  Time out: Immediately prior to the procedure a time out was called    Universal Protocol: the Joint Commission Universal Protocol was followed    Preparation: Patient was prepped and draped in usual sterile fashion           ANESTHESIA    Anesthesia: Local infiltration  Local Anesthetic:  Lidocaine 1% without epinephrine      SEDATION    Patient Sedated: No    See dictated procedure note for full details.  Findings: Liver MWA    Specimens: none    Complications: None    Condition: Stable    Plan: 3c    PROCEDURE   Patient Tolerance:  Patient tolerated the procedure well with no immediate complications    Length of time physician/provider present for 1:1 monitoring during sedation: 0

## 2020-07-29 NOTE — ANESTHESIA POSTPROCEDURE EVALUATION
Anesthesia POST Procedure Evaluation    Patient: Jerome Schoen   MRN:     [id number] Gender:   male   Age:    60 year old :      1960        Preoperative Diagnosis: Hepatocellular carcinoma (H) [C22.0]   Procedure(s):  ANESTHESIA OUT OF OR liver ablation [facility] case @12:30   Postop Comments: No value filed.     Anesthesia Type: General          Postop Pain Control: Uneventful            Sign Out: Well controlled pain   PONV: No   Neuro/Psych: Uneventful            Sign Out: Acceptable/Baseline neuro status   Airway/Respiratory: Uneventful            Sign Out: Acceptable/Baseline resp. status   CV/Hemodynamics: Uneventful            Sign Out: Acceptable CV status   Other NRE: NONE   DID A NON-ROUTINE EVENT OCCUR? No         Last Anesthesia Record Vitals:  CRNA VITALS  2020 1547 - 2020 1647      2020             NIBP:  106/77    Pulse:  60    NIBP Mean:  86    Ht Rate:  59    SpO2:  100 %    Resp Rate (observed):  (!) 2          Last PACU Vitals:  Vitals Value Taken Time   /87 2020  5:45 PM   Temp 36.2  C (97.2  F) 2020  5:30 PM   Pulse 62 2020  5:38 PM   Resp 9 2020  5:54 PM   SpO2 99 % 2020  6:03 PM   Temp src     NIBP     Pulse     SpO2     Resp     Temp     Ht Rate     Temp 2     Vitals shown include unvalidated device data.      Electronically Signed By: Sarah Wachter, MD, 2020, 6:36 PM

## 2020-07-29 NOTE — DISCHARGE INSTRUCTIONS
1. No heavy lifting for 2 days (no greater than 10 pounds)    2. Resume usual diet    3. Can shower tomorrow    4. Dressing can come off at time of next shower    5. Pain killers as needed    6. Interventional Radiology will call you and arrange your follow up    [facility]  Same-Day Surgery   Adult Discharge Orders & Instructions     For 24 hours after surgery    1. Get plenty of rest.  A responsible adult must stay with you for at least 24 hours after you leave the hospital.   2. Do not drive or use heavy equipment.  If you have weakness or tingling, don't drive or use heavy equipment until this feeling goes away.  3. Do not drink alcohol.  4. Avoid strenuous or risky activities.  Ask for help when climbing stairs.   5. You may feel lightheaded.  IF so, sit for a few minutes before standing.  Have someone help you get up.   6. If you have nausea (feel sick to your stomach): Drink only clear liquids such as apple juice, ginger ale, broth or 7-Up.  Rest may also help.  Be sure to drink enough fluids.  Move to a regular diet as you feel able.  7. You may have a slight fever. Call the doctor if your fever is over 100 F (37.7 C) (taken under the tongue) or lasts longer than 24 hours.  8. You may have a dry mouth, a sore throat, muscle aches or trouble sleeping.  These should go away after 24 hours.  9. Do not make important or legal decisions.   Call your doctor for any of the followin.  Signs of infection (fever, growing tenderness at the surgery site, a large amount of drainage or bleeding, severe pain, foul-smelling drainage, redness, swelling).    2. It has been over 8 to 10 hours since surgery and you are still not able to urinate (pass water).    3.  Headache for over 24 hours.      To contact a doctor, call Dr. Sheth @ [phone] or:        [phone] and ask for the resident on call for         Interventional Radiology (answered 24 hours a day)- at night  or on the weekend.       Emergency Department:    [facility]: [phone]       (TTY for hearing impaired: [phone])

## 2020-07-29 NOTE — PROGRESS NOTES
Dr. Sheth at bedside. Assessed groin site and distal pulse. Explained necessity of the 3 hours of bedrest to patient.

## 2020-07-29 NOTE — OR NURSING
Patient was under the impression that he would be discharged at 1530; explained to patient and called his spouse to explain that he would not be discharged at 1530. Explained that the procedure is scheduled to conclude at 1530 however, that is if it starts on time but the patient would need to be recovered and that time depends on what is done during the procedure. Informed patient and his spouse that the very earliest discharge maybe is 1800 if the procedure starts on time. Both verbalized understanding.

## 2020-07-30 LAB
GLUCOSE BLDC GLUCOMTR-MCNC: 176 MG/DL (ref 70–99)
INTERPRETATION ECG - MUSE: NORMAL

## 2020-07-31 ENCOUNTER — TELEPHONE (OUTPATIENT)
Dept: RADIOLOGY | Facility: CLINIC | Age: 60
End: 2020-07-31

## 2020-07-31 DIAGNOSIS — C22.0 HCC (HEPATOCELLULAR CARCINOMA) (H): Primary | ICD-10-CM

## 2020-07-31 NOTE — TELEPHONE ENCOUNTER
I called and spoke with Hema after his liver ablation procedure with Dr Sheth.  He is having pain on his side, he is pretty much laying low.  He is taking his oxcydone 5mg and it isn't fully covering but he doesn't want to take narcotics.  I told him to try and take ibuprofen but he said he can't.  He has tried aleve.  He is eating and drinking, and takes mirilax daily.  I will touch base with him on Monday to find out how his pain is, he will continue to take the oxy thru the weekend.  Pt has frequent hiccups when he sits up. He would prefer to get his follow up MRI and labs at the [facility] in [facility], closer to home.  OPAL Jaime RN, BSN  Interventional Radiology Nurse Coordinator   Phone:  [phone]

## 2020-08-01 ENCOUNTER — NURSE TRIAGE (OUTPATIENT)
Dept: NURSING | Facility: CLINIC | Age: 60
End: 2020-08-01

## 2020-08-01 NOTE — TELEPHONE ENCOUNTER
Patient reports he had liver surgery 2 days ago and says pain on his right side is worse because he has the hiccups.  Patient report pain is at 10/10 and the PRN pain med does not help.  Patient says he does not want to be seen in the ED.  FNA offered to page  On-call surgeon to call him but patient declined.    Additional Information    Negative: [1] Hiccups present > 3 hours AND [2] severe AND [3] no improvement using hiccup treatment per protocol    [1] Hiccups present > 24 hours AND [2] nearly continuous    Protocols used: [id number]

## 2020-08-03 DIAGNOSIS — R06.6 INTRACTABLE HICCUPS: Primary | ICD-10-CM

## 2020-08-03 RX ORDER — METHYLPREDNISOLONE 4 MG
TABLET, DOSE PACK ORAL
Qty: 21 TABLET | Refills: 0 | Status: SHIPPED | OUTPATIENT
Start: 2020-08-03

## 2020-08-03 RX ORDER — BACLOFEN 10 MG/1
10 TABLET ORAL 3 TIMES DAILY
Qty: 42 TABLET | Refills: 0 | Status: SHIPPED | OUTPATIENT
Start: 2020-08-03 | End: 2020-08-17

## 2020-08-03 NOTE — PROGRESS NOTES
Pt's daughter called regarding her father Hema and intractable hiccups after TACE/Ablation procedure on 7/29 with Dr. Sheth. They tried benadryl over the weekend as instructed by Dr. Sheth, but it has not worked. Hiccups are quite severe and causing her father significant pain in the abdomen and groin. This is limiting his ability to ambulate.     Discussed with Dr. Sheth who has recommended Baclofen 10 mg TID x14 days and a medrol dose pack. These medications were prescribed and electronically sent to the pharmacy as indicated by pt's daughter. She confirms the recommendations and understands she needs to call IR on-call back if there are further concerns or if these medications do not help manage symptoms by Wednesday of this week. We can see these symptoms persist for upwards of 10 days as this is likely related to irritation of the diaphragm from the ablation.     Virginia Nguyễn DNP, APRN  Interventional Radiology   IR on-call pager: [phone]

## 2020-08-03 NOTE — TELEPHONE ENCOUNTER
I called and left a voicemail including my call back number.  OPAL Jaime, RN, BSN  Interventional Radiology Nurse Coordinator   Phone:  [phone]

## 2020-08-04 NOTE — TELEPHONE ENCOUNTER
I spoke with Hema,  He is feeling very weak.  His hiccups are much better since starting the baclofen.  We talked about taking the steroids to help with fatigue.  He is eating and drinking.  I have also spoken with Daughter Cheryl and given her my contact info for questions or concerns.  OPAL Jaime RN, BSN  Interventional Radiology Nurse Coordinator   Phone:  [phone]

## 2020-08-04 NOTE — TELEPHONE ENCOUNTER
I called and left a voicemail including my call back number.  OPAL Jaime, RN, BSN  Interventional Radiology Nurse Coordinator   Phone:  [phone]

## 2020-08-05 ENCOUNTER — TELEPHONE (OUTPATIENT)
Dept: RADIOLOGY | Facility: CLINIC | Age: 60
End: 2020-08-05

## 2020-08-05 NOTE — TELEPHONE ENCOUNTER
I spoke with Hema, his PCP has ordered valium for his anxiety for premed for MRI.  He is getting f/up mri on 9/1 near home.  Pt will have f/up virtual visit with Dr Sheth on 9/9/20 message sent to schedule appt.  OPAL Jaime RN, BSN  Interventional Radiology Nurse Coordinator   Phone:  [phone]

## 2020-08-05 NOTE — TELEPHONE ENCOUNTER
Please call and sched pt for his 1 month post HCC tx virtual visit with Dr Sheth     Due on Sept 9th

## 2020-08-28 ENCOUNTER — TELEPHONE (OUTPATIENT)
Dept: ONCOLOGY | Facility: CLINIC | Age: 60
End: 2020-08-28

## 2020-08-31 ENCOUNTER — TRANSFERRED RECORDS (OUTPATIENT)
Dept: HEALTH INFORMATION MANAGEMENT | Facility: CLINIC | Age: 60
End: 2020-08-31

## 2020-09-02 ENCOUNTER — DOCUMENTATION ONLY (OUTPATIENT)
Dept: CARE COORDINATION | Facility: CLINIC | Age: 60
End: 2020-09-02

## 2020-09-03 NOTE — TELEPHONE ENCOUNTER
Tobacco Treatment Program at the [facility] attempted to reach Mr. Schoen on 9/3/2020 regarding the tobacco cessation program to help Mr. Schoen to quit smoking. We will attempt to reach Mr. Schoen another time.     Kristina Matta [facility]  Tobacco Treatment Specialist  PH: [phone]

## 2020-09-08 ENCOUNTER — HOSPITAL ENCOUNTER (INPATIENT)
Dept: GENERAL RADIOLOGY | Facility: CLINIC | Age: 60
End: 2020-09-08
Attending: RADIOLOGY
Payer: COMMERCIAL

## 2020-09-09 ENCOUNTER — VIRTUAL VISIT (OUTPATIENT)
Dept: RADIOLOGY | Facility: CLINIC | Age: 60
End: 2020-09-09
Attending: RADIOLOGY
Payer: COMMERCIAL

## 2020-09-09 DIAGNOSIS — C22.0 HCC (HEPATOCELLULAR CARCINOMA) (H): Primary | ICD-10-CM

## 2020-09-09 PROCEDURE — 40001009 ZZH VIDEO/TELEPHONE VISIT; NO CHARGE

## 2020-09-09 RX ORDER — DIAZEPAM 5 MG
TABLET ORAL
COMMUNITY
Start: 2020-08-20

## 2020-09-09 RX ORDER — INSULIN ASPART 100 [IU]/ML
4 INJECTION, SOLUTION INTRAVENOUS; SUBCUTANEOUS
COMMUNITY
Start: 2020-08-06 | End: 2021-08-06

## 2020-09-09 NOTE — PATIENT INSTRUCTIONS
Hema you have had your virtual follow up visit today with Dr Sheth IR to discuss 1 month post liver cancer treatment.  Your imaging completed on 8/31/20 has been discussed with you during this visit.    Plan    -Another MRI abdomen and virtual visit with Dr Sheth at 3 months post Ablation.      -We will contact you to schedule those appointments.    OPAL Jaime, RN, BSN  Interventional Radiology Nurse Coordinator   Phone:  [phone]

## 2020-09-09 NOTE — PROGRESS NOTES
"Jerome Schoen is a 60 year old male who is being evaluated via a billable telephone visit.      The patient has been notified of following:     \"This telephone visit will be conducted via a call between you and your physician/provider. We have found that certain health care needs can be provided without the need for a physical exam.  This service lets us provide the care you need with a short phone conversation.  If a prescription is necessary we can send it directly to your pharmacy.  If lab work is needed we can place an order for that and you can then stop by our lab to have the test done at a later time.    Telephone visits are billed at different rates depending on your insurance coverage. During this emergency period, for some insurers they may be billed the same as an in-person visit.  Please reach out to your insurance provider with any questions.    If during the course of the call the physician/provider feels a telephone visit is not appropriate, you will not be charged for this service.\"    Patient has given verbal consent for Telephone visit?  Yes    What phone number would you like to be contacted at? [phone]    How would you like to obtain your AVS? Mail a copy     Vitals - Patient Reported  Height (Patient Reported): 172.7 cm (5' 7.99\")  Pain Score: Moderate Pain (5)  Pain Loc: Other - see comment(ribs)    Patient at another doctor appointment right now for rib pain 5/10. Will be ready for call at 11:20.    Alejo Black LPN    Phone call duration: 10 minutes    Shira Sheth MD      "

## 2020-09-09 NOTE — LETTER
"    9/9/2020         RE: Jerome Schoen  [address] [place] [id number]        Dear Colleague,    Thank you for referring your patient, Jerome Schoen, to the [facility]. Please see a copy of my visit note below.    Jerome Schoen is a 60 year old male who is being evaluated via a billable telephone visit.      The patient has been notified of following:     \"This telephone visit will be conducted via a call between you and your physician/provider. We have found that certain health care needs can be provided without the need for a physical exam.  This service lets us provide the care you need with a short phone conversation.  If a prescription is necessary we can send it directly to your pharmacy.  If lab work is needed we can place an order for that and you can then stop by our lab to have the test done at a later time.    Telephone visits are billed at different rates depending on your insurance coverage. During this emergency period, for some insurers they may be billed the same as an in-person visit.  Please reach out to your insurance provider with any questions.    If during the course of the call the physician/provider feels a telephone visit is not appropriate, you will not be charged for this service.\"    Patient has given verbal consent for Telephone visit?  Yes    What phone number would you like to be contacted at? [phone]    How would you like to obtain your AVS? Mail a copy     Vitals - Patient Reported  Height (Patient Reported): 172.7 cm (5' 7.99\")  Pain Score: Moderate Pain (5)  Pain Loc: Other - see comment(ribs)    Patient at another doctor appointment right now for rib pain 5/10. Will be ready for call at 11:20.    Alejo Black LPN    Phone call duration: 10 minutes    Shira Sheth MD            INTERVENTIONAL RADIOLOGY CONSULTATION    Name: Jerome Schoen  Age: 60 year old   Referring Physician: Dr. Allan   REASON FOR REFERRAL: Unresectable HCC  "     HPI: Still having pain along right rib, he states radiates anteriorly and posteriorly. Worse when lying down or sitting for long periods. It wakes him from sleep. Hiccups are resolved. No jaundice or scleral icterus or confusion. No n/v, abdominal distention, GI bleeding.    No fever, cough, chest pain.    PAST MEDICAL HISTORY:   Past Medical History:   Diagnosis Date     Cirrhosis (H)      Continuous tobacco abuse      DM type 2 (diabetes mellitus, type 2) (H)      HCC (hepatocellular carcinoma) (H)      HCV (hepatitis C virus)      HTN (hypertension)        PAST SURGICAL HISTORY:   Past Surgical History:   Procedure Laterality Date     APPENDECTOMY       ARTHROSCOPY KNEE BILATERAL       CHOLECYSTECTOMY       IR VISCERAL ANGIOGRAM  2020       FAMILY HISTORY:   Family History   Problem Relation Age of Onset     Cerebrovascular Disease Mother      Myocardial Infarction Mother      Other - See Comments Father          from trauma in his 50s     Alcoholism Father      Diabetes Sister      Heart Disease Sister      Coronary Artery Disease Sister      Alcoholism Sister      Other - See Comments Half-Brother          at age 21 from drowning     Seizure Disorder Half-Brother        SOCIAL HISTORY:   Social History     Tobacco Use     Smoking status: Current Every Day Smoker     Packs/day: 1.00     Years: 46.00     Pack years: 46.00     Types: Cigarettes     Smokeless tobacco: Never Used   Substance Use Topics     Alcohol use: Yes     Comment: 1 beer every couple months       PROBLEM LIST:   Patient Active Problem List    Diagnosis Date Noted     Aortic root dilatation (H) 2019     Priority: Medium     4.0 on echo       Cirrhosis of liver (H) 2019     Priority: Medium     Dyslipidemia 2019     Priority: Medium     Essential hypertension 2019     Priority: Medium     Type 2 diabetes mellitus without complications (H) 2019     Priority: Medium     Viral hepatitis C 2019      Priority: Medium     Left upper quadrant abdominal tenderness 04/15/2019     Priority: Medium     Tobacco user 12/15/2009     Priority: Medium       MEDICATIONS:   Prescription Medications as of 9/9/2020       Rx Number Disp Refills Start End Last Dispensed Date Next Fill Date Owning Pharmacy    APPLE CIDER VINEGAR PO        [facility] [address]    Sig: Take 1 capsule by mouth as needed    Class: Historical    Route: Oral    atenolol (TENORMIN) 100 MG tablet    3/11/2019    [facility] [address]    Sig: Take 100 mg by mouth every morning     Class: Historical    Route: Oral    chlorthalidone (HYGROTON) 25 MG tablet    2/1/2019        Sig: Take 25 mg by mouth every morning     Class: Historical    Route: Oral    diazepam (VALIUM) 5 MG tablet    8/20/2020        Sig: take 2 tablets by mouth once prior to MRI    Class: Historical    glipiZIDE (GLUCOTROL XL) 10 MG 24 hr tablet    2/1/2019    [facility] [address]    Sig: Take 10 mg by mouth every morning     Class: Historical    Route: Oral    insulin aspart (NOVOLOG FLEXPEN) 100 UNIT/ML pen    8/6/2020 8/6/2021       Sig: Inject 4 Units Subcutaneous    Class: Historical    Route: Subcutaneous    lisinopril (ZESTRIL) 20 MG tablet        [facility] [address]    Sig: Take 40 mg by mouth every morning     Class: Historical    Route: Oral    metFORMIN (GLUCOPHAGE) 500 MG tablet    2/1/2019    [facility] [address]    Sig: Take 500 mg by mouth every morning     Class: Historical    Route: Oral    methylPREDNISolone (MEDROL) 4 MG tablet therapy pack  21 tablet 0 8/3/2020    [facility] [address]    Sig: Follow Package Directions    Class: E-Prescribe    OMEPRAZOLE PO        [facility] [address]    Sig: Take 30 mg by mouth as needed     Class: Historical    Route: Oral    ondansetron (ZOFRAN) 4 MG tablet  20 tablet 0 7/29/2020    [facility] [place] [facility] [place] [place] - [address]    Sig: Take 1 tablet (4 mg) by mouth every 6 hours as needed for nausea    Class: E-Prescribe    Route: Oral    oxyCODONE IR (ROXICODONE) 10 MG tablet            Sig: Take 5 mg by mouth as needed for severe pain     Class: Historical    Route: Oral    polyethylene glycol (MIRALAX) 17 GM/SCOOP powder    4/5/2019    [facility] [address]    Sig: Take 1 capful by mouth as needed     Class: Historical    Route: Oral    PRAVASTATIN SODIUM PO        [facility] [address]    Sig: Take 40 mg by mouth daily    Class: Historical    Route: Oral          ALLERGIES:   Patient has no known allergies.    ROS:  As above      Physical Examination:   VITALS:   There were no vitals taken for this visit.  Constitutional: Answering questions appropriately  Respiratory: Non-labored breathing  Psychiatric: affect normal/bright and mentation appears normal.      Labs:    BMP RESULTS:  Lab Results   Component Value Date     07/29/2020    POTASSIUM 3.4 07/29/2020    CHLORIDE 103 07/29/2020    CO2 32 07/29/2020    ANIONGAP 1 (L) 07/29/2020     (H) 07/29/2020    BUN 11 07/29/2020    CR 0.61 (L) 07/29/2020    GFRESTIMATED >90 07/29/2020    GFRESTBLACK >90 07/29/2020    JENELLE 8.9 07/29/2020        CBC RESULTS:  Lab Results   Component Value Date    WBC 5.8 07/29/2020    RBC 5.17 07/29/2020    HGB 15.2 07/29/2020    HCT 44.4 07/29/2020    MCV 86 07/29/2020    MCH 29.4 07/29/2020    MCHC 34.2 07/29/2020    RDW 12.9 07/29/2020    PLT 64 (L) 07/29/2020       INR/PTT:  Lab Results   Component Value Date    INR 1.13 07/29/2020    PTT 31 07/29/2020       Diagnostic studies: Imaging reviewed by me.     IMPRESSION:  1. Cirrhosis and evidence of  portal hypertension including  splenomegaly and trace ascites.  2. Postablation changes in segment 8 with contiguous area of arterial  phase enhancement, and associated nonenhancing bland thrombus in the  anterior segmental branch of right portal vein representing LIRADS  5TR- equivocal (noting that suspicion of residual tumor is very low).  3. Few arterially enhancing focal lesion with no definite new  HCC;LIRADS 3  4. Based on this exam only, the patient is within [place] criteria.       Assessment: 61 yo M with Hep C, EtOH cirrhosis with unresectable, 2.1 cm segment 8 HCC. s/p percutaneous microwave ablation on 7/29/2020, with convincing evidence of residual disease on one month follow-up MRI.     MELD 7  Child Loepz A (5 points)  ECOG 0    Plan:   1. MRI at 3 months post-abl(end of October 2020)  2. Intercostal/rib pain- I discussed this with his local physician, Yamileth Allan, QUINN, who will arrange intercostal block at appropriate levels with the local regional anesthesia service.    It was a pleasure to talk to Mr. Schoen today. Thank you for involving the Interventional Radiology service in his care.    I spent a total of 10 minutes with this patient, over 50% time was for counseling and care coordination.    Shira Sheth MD  Interventional Radiology   Pager [phone]    CC  Patient Care Team:  Yamileth Allan PA-C as PCP - General

## 2020-09-09 NOTE — PROGRESS NOTES
INTERVENTIONAL RADIOLOGY CONSULTATION    Name: Jerome Schoen  Age: 60 year old   Referring Physician: Dr. Allan   REASON FOR REFERRAL: Unresectable HCC      HPI: Still having pain along right rib, he states radiates anteriorly and posteriorly. Worse when lying down or sitting for long periods. It wakes him from sleep. Hiccups are resolved. No jaundice or scleral icterus or confusion. No n/v, abdominal distention, GI bleeding.    No fever, cough, chest pain.    PAST MEDICAL HISTORY:   Past Medical History:   Diagnosis Date     Cirrhosis (H)      Continuous tobacco abuse      DM type 2 (diabetes mellitus, type 2) (H)      HCC (hepatocellular carcinoma) (H)      HCV (hepatitis C virus)      HTN (hypertension)        PAST SURGICAL HISTORY:   Past Surgical History:   Procedure Laterality Date     APPENDECTOMY       ARTHROSCOPY KNEE BILATERAL       CHOLECYSTECTOMY       IR VISCERAL ANGIOGRAM  2020       FAMILY HISTORY:   Family History   Problem Relation Age of Onset     Cerebrovascular Disease Mother      Myocardial Infarction Mother      Other - See Comments Father          from trauma in his 50s     Alcoholism Father      Diabetes Sister      Heart Disease Sister      Coronary Artery Disease Sister      Alcoholism Sister      Other - See Comments Half-Brother          at age 21 from drowning     Seizure Disorder Half-Brother        SOCIAL HISTORY:   Social History     Tobacco Use     Smoking status: Current Every Day Smoker     Packs/day: 1.00     Years: 46.00     Pack years: 46.00     Types: Cigarettes     Smokeless tobacco: Never Used   Substance Use Topics     Alcohol use: Yes     Comment: 1 beer every couple months       PROBLEM LIST:   Patient Active Problem List    Diagnosis Date Noted     Aortic root dilatation (H) 2019     Priority: Medium     4.0 on echo       Cirrhosis of liver (H) 2019     Priority: Medium     Dyslipidemia 2019     Priority: Medium     Essential  hypertension 11/04/2019     Priority: Medium     Type 2 diabetes mellitus without complications (H) 11/04/2019     Priority: Medium     Viral hepatitis C 11/04/2019     Priority: Medium     Left upper quadrant abdominal tenderness 04/15/2019     Priority: Medium     Tobacco user 12/15/2009     Priority: Medium       MEDICATIONS:   Prescription Medications as of 9/9/2020       Rx Number Disp Refills Start End Last Dispensed Date Next Fill Date Owning Pharmacy    APPLE CIDER VINEGAR BRITNI        [facility] [address]    Sig: Take 1 capsule by mouth as needed    Class: Historical    Route: Oral    atenolol (TENORMIN) 100 MG tablet    3/11/2019    [facility] [address]    Sig: Take 100 mg by mouth every morning     Class: Historical    Route: Oral    chlorthalidone (HYGROTON) 25 MG tablet    2/1/2019        Sig: Take 25 mg by mouth every morning     Class: Historical    Route: Oral    diazepam (VALIUM) 5 MG tablet    8/20/2020        Sig: take 2 tablets by mouth once prior to MRI    Class: Historical    glipiZIDE (GLUCOTROL XL) 10 MG 24 hr tablet    2/1/2019    [facility] [address]    Sig: Take 10 mg by mouth every morning     Class: Historical    Route: Oral    insulin aspart (NOVOLOG FLEXPEN) 100 UNIT/ML pen    8/6/2020 8/6/2021       Sig: Inject 4 Units Subcutaneous    Class: Historical    Route: Subcutaneous    lisinopril (ZESTRIL) 20 MG tablet        [facility] [address]    Sig: Take 40 mg by mouth every morning     Class: Historical    Route: Oral    metFORMIN (GLUCOPHAGE) 500 MG tablet    2/1/2019    [facility] [address]    Sig: Take 500 mg by mouth every morning     Class: Historical    Route: Oral    methylPREDNISolone (MEDROL) 4 MG tablet therapy pack  21 tablet 0 8/3/2020    [facility]  [facility] [address]    Sig: Follow Package Directions    Class: E-Prescribe    OMEPRAZOLE PO        [facility] [address]    Sig: Take 30 mg by mouth as needed     Class: Historical    Route: Oral    ondansetron (ZOFRAN) 4 MG tablet  20 tablet 0 7/29/2020    [facility] [place], [place] - [address] [place] [address]    Sig: Take 1 tablet (4 mg) by mouth every 6 hours as needed for nausea    Class: E-Prescribe    Route: Oral    oxyCODONE IR (ROXICODONE) 10 MG tablet            Sig: Take 5 mg by mouth as needed for severe pain     Class: Historical    Route: Oral    polyethylene glycol (MIRALAX) 17 GM/SCOOP powder    4/5/2019    [facility] [address]    Sig: Take 1 capful by mouth as needed     Class: Historical    Route: Oral    PRAVASTATIN SODIUM PO        [place] [address]    Sig: Take 40 mg by mouth daily    Class: Historical    Route: Oral          ALLERGIES:   Patient has no known allergies.    ROS:  As above      Physical Examination:   VITALS:   There were no vitals taken for this visit.  Constitutional: Answering questions appropriately  Respiratory: Non-labored breathing  Psychiatric: affect normal/bright and mentation appears normal.      Labs:    BMP RESULTS:  Lab Results   Component Value Date     07/29/2020    POTASSIUM 3.4 07/29/2020    CHLORIDE 103 07/29/2020    CO2 32 07/29/2020    ANIONGAP 1 (L) 07/29/2020     (H) 07/29/2020    BUN 11 07/29/2020    CR 0.61 (L) 07/29/2020    GFRESTIMATED >90 07/29/2020    GFRESTBLACK >90 07/29/2020    JENELLE 8.9 07/29/2020        CBC RESULTS:  Lab Results   Component Value Date    WBC 5.8 07/29/2020    RBC 5.17 07/29/2020    HGB 15.2 07/29/2020    HCT 44.4 07/29/2020    MCV 86 07/29/2020    MCH 29.4 07/29/2020    MCHC 34.2 07/29/2020    RDW 12.9 07/29/2020    PLT 64 (L) 07/29/2020        INR/PTT:  Lab Results   Component Value Date    INR 1.13 07/29/2020    PTT 31 07/29/2020       Diagnostic studies: Imaging reviewed by me.     IMPRESSION:  1. Cirrhosis and evidence of portal hypertension including  splenomegaly and trace ascites.  2. Postablation changes in segment 8 with contiguous area of arterial  phase enhancement, and associated nonenhancing bland thrombus in the  anterior segmental branch of right portal vein representing LIRADS  5TR- equivocal (noting that suspicion of residual tumor is very low).  3. Few arterially enhancing focal lesion with no definite new  HCC;LIRADS 3  4. Based on this exam only, the patient is within [place] criteria.       Assessment: 61 yo M with Hep C, EtOH cirrhosis with unresectable, 2.1 cm segment 8 HCC. s/p percutaneous microwave ablation on 7/29/2020, with convincing evidence of residual disease on one month follow-up MRI.     MELD 7  Child Lopez A (5 points)  ECOG 0    Plan:   1. MRI at 3 months post-abl(end of October 2020)  2. Intercostal/rib pain- I discussed this with his local physician, Yamileth Allan, PAC, who will arrange intercostal block at appropriate levels with the local regional anesthesia service.    It was a pleasure to talk to Mr. Schoen today. Thank you for involving the Interventional Radiology service in his care.    I spent a total of 10 minutes with this patient, over 50% time was for counseling and care coordination.    Shira Sheth MD  Interventional Radiology   Pager [phone]    CC  Patient Care Team:  Yamileth Allan PA-C as PCP - General  YAMILETH ALLAN

## 2020-09-11 ENCOUNTER — DOCUMENTATION ONLY (OUTPATIENT)
Dept: TRANSPLANT | Facility: CLINIC | Age: 60
End: 2020-09-11

## 2020-09-11 NOTE — PROGRESS NOTES
Outside done    8/31/20 OSH imaging  1.) tx changes - non-viable at this time  2.) portal brand vessel thrombosed (R anterior branch)   > watch in future, not HCC on these image  3.) no HCC at this time    Recs:  - 2 to 3 mo follow up, no later

## 2020-09-14 NOTE — TELEPHONE ENCOUNTER
Pt states he has no interest in quitting smoking. [place]     Tobacco Treatment Team at the [facility] spoke with Mr. Schoen on 9/14/2020 regarding the tobacco cessation program and he declined to participate with the program.No further calls needed.    Kristina Matta [facility]  Tobacco Treatment Specialist  PH: [phone]

## 2020-09-22 ENCOUNTER — TELEPHONE (OUTPATIENT)
Dept: RADIOLOGY | Facility: CLINIC | Age: 60
End: 2020-09-22

## 2020-09-22 NOTE — TELEPHONE ENCOUNTER
1st attempt to schedule patient for VLC 11/4 @1 with Dr. Sheth per ADITYA Tucker's request.   Also, please note patients MRI order has been faxed to [facility], near patients home, and should be completed the week prior.   No answer, left detailed message with direct number requesting a return call.

## 2020-09-24 ENCOUNTER — TELEPHONE (OUTPATIENT)
Dept: RADIOLOGY | Facility: CLINIC | Age: 60
End: 2020-09-24

## 2020-09-24 NOTE — TELEPHONE ENCOUNTER
"I called and spoke with Hema.  He had block by the pain doctor at the [facility], He didn't know the name.  He said he was numb that night and slept and then the next day his pain was back and he thinks worse.  He said walking is the only thing that makes it better.  He doesn't go out much.  He said he has pain right side under his ribs.  He states he is taking 10 Aleve/day and 10 acetominophen 500mg/day.  He is using lidocaine patches 3x/day and that doesn't help.  He isn't able to sit or drive in a car. He does take sleeping pills at , but those \"don't work\".  I have a call out to Yamileth Allan NP PCP, after talking to Dr Sheth.  Per pt he was going to speak with Yamileth tomorrow and had talked to her on Wednesday and was to try the lidocaine patches.  Plan is to suggest another block or send to pain specialist.    OPAL Jaime RN, BSN  Interventional Radiology Nurse Coordinator   Phone:  [phone]    "

## 2020-10-15 ENCOUNTER — TELEPHONE (OUTPATIENT)
Dept: RADIOLOGY | Facility: CLINIC | Age: 60
End: 2020-10-15

## 2020-10-15 NOTE — TELEPHONE ENCOUNTER
Hema called and left a message that he does not wish to have his follow up HCC abdomen MRI at this time.  He is dealing with his wife's diagnosis cancer treatment, he is going back to work, and he does not wish to do this follow up at this time.  OPAL Jaime RN, BSN  Interventional Radiology Nurse Coordinator   Phone:  [phone]

## 2020-10-20 ENCOUNTER — DOCUMENTATION ONLY (OUTPATIENT)
Dept: RADIOLOGY | Facility: CLINIC | Age: 60
End: 2020-10-20

## 2020-10-20 NOTE — PROGRESS NOTES
INTERVENTIONAL RADIOLOGY CONSULTATION    Name: Jerome Schoen  Age: 60 year old   Referring Physician: Dr. Mae  REASON FOR REFERRAL: Unresectable HCC.    HPI: Mr. Schoen is soon due for his follow-up MRI.  He is nearly 3 months status post percutaneous ablation of his unresectable HCC.  Caitlin Jaime, our nurse coordinator, spoke to the patient as he is refusing to undergo his 3-month MRI.  We communicated to him that it is vital he get this imaging done at this time, as any residual or recurrent tumor can become aggressive following ablation.  This can lead to more aggressive and rapidly advancing malignancy.  Despite passing this information on to him, the patient is refusing to undergo his follow-up MRI.  I have reached out to his hepatologist, Dr. Mae, to make him aware.  A copy of this note is being sent to his primary care provider as well.    Shira Sheth MD  Interventional Radiology Attending  [facility]   Pager [phone]    CC  Patient Care Team:  Yamileth Allan PA-C as PCP - General

## 2020-10-21 ENCOUNTER — TELEPHONE (OUTPATIENT)
Dept: TRANSPLANT | Facility: CLINIC | Age: 60
End: 2020-10-21

## 2020-10-21 NOTE — TELEPHONE ENCOUNTER
"Spoke to Hema and repeatively said \"I am done\", going back to work in a few weeks and does not want any further care here. Is seeing his PCP tomorrow.     Encouraged Hema to discuss with his PCP and to seek care somewhere else if need be but not to avoid his health and wellness. Reflective listening.       "

## 2021-01-29 ENCOUNTER — TRANSFERRED RECORDS (OUTPATIENT)
Dept: HEALTH INFORMATION MANAGEMENT | Facility: CLINIC | Age: 61
End: 2021-01-29

## 2021-02-04 ENCOUNTER — TELEPHONE (OUTPATIENT)
Dept: RADIOLOGY | Facility: CLINIC | Age: 61
End: 2021-02-04

## 2021-02-04 NOTE — TELEPHONE ENCOUNTER
I returned Hema's call.  He has been Hurting (his liver) for a month.  He recently had a CT abd done 2/1 at [facility] near home and they told him it was his liver.  He is wanting to get his follow up MRI he denied scheduling months ago due to social issues.  He is wanting MRI at [facility] fax [phone], they require provider notes for authorization purposes and they can push into  PACS.   I will get the latest CT scan pushed into PACs [facility] and have Dr Sheth review, I have also requested the lab results be faxed from [facility].  [phone].  OPAL Jaime, RN, BSN  Interventional Radiology Nurse Coordinator   Phone:  [phone]

## 2021-02-08 NOTE — TELEPHONE ENCOUNTER
I spoke with Hema, orders for MRI abd have been faxed to [facility] , per pt he will get peformed either Friday this week or no later than Monday.   Plan is to get virtual visit with Dr Sheth on wed to discuss.  OPAL Jaime, RN, BSN  Interventional Radiology Nurse Coordinator   Phone:  [phone]

## 2021-02-12 ENCOUNTER — TELEPHONE (OUTPATIENT)
Dept: RADIOLOGY | Facility: CLINIC | Age: 61
End: 2021-02-12

## 2021-02-12 ENCOUNTER — HOSPITAL ENCOUNTER (INPATIENT)
Dept: GENERAL RADIOLOGY | Facility: CLINIC | Age: 61
End: 2021-02-12
Attending: RADIOLOGY
Payer: COMMERCIAL

## 2021-02-12 ENCOUNTER — TRANSFERRED RECORDS (OUTPATIENT)
Dept: HEALTH INFORMATION MANAGEMENT | Facility: CLINIC | Age: 61
End: 2021-02-12

## 2021-02-12 NOTE — TELEPHONE ENCOUNTER
I spoke with Hema, he hasn't been eating well for weeks,  He is awaiting new dentures and has been having abd pain, mostly right sided and low back and when he eats.  He has occasional nausea annd sometimes can eat meat sometimes not.  He has given up pork.  He is a  and takes mirilax for constipation.  He says he has been losing weight, prior to HCC treatment he was about 220, now down to 190.  He denies belly fullness or bloating, denies lower extremity edema.  Pt's latest MRI results are loaded into pacs and consult with Domenic is scheduled.  OPAL Jaime, RN, BSN  Interventional Radiology Nurse Coordinator   Phone:  [phone]

## 2021-02-17 ENCOUNTER — VIRTUAL VISIT (OUTPATIENT)
Dept: RADIOLOGY | Facility: CLINIC | Age: 61
End: 2021-02-17
Attending: RADIOLOGY
Payer: COMMERCIAL

## 2021-02-17 ENCOUNTER — TELEPHONE (OUTPATIENT)
Dept: RADIOLOGY | Facility: CLINIC | Age: 61
End: 2021-02-17

## 2021-02-17 DIAGNOSIS — C22.0 HCC (HEPATOCELLULAR CARCINOMA) (H): Primary | ICD-10-CM

## 2021-02-17 PROCEDURE — 999N001193 HC VIDEO/TELEPHONE VISIT; NO CHARGE

## 2021-02-17 PROCEDURE — 99214 OFFICE O/P EST MOD 30 MIN: CPT | Mod: 95 | Performed by: RADIOLOGY

## 2021-02-17 NOTE — LETTER
"    2/17/2021         RE: Jerome Schoen  [address] [address]        Dear Colleague,    Thank you for referring your patient, Jerome Schoen, to the [facility]. Please see a copy of my visit note below.    Hema is a 60 year old who is being evaluated via a billable telephone visit.      What phone number would you like to be contacted at? [phone]  How would you like to obtain your AVS? Mail a copy     Vitals - Patient Reported  Weight (Patient Reported): 87.1 kg (192 lb)  Height (Patient Reported): 172.7 cm (5' 8\")  BMI (Based on Pt Reported Ht/Wt): 29.19  Pain Score: Extreme Pain (8)  Pain Loc: Other - see comment(RIGHT SIDE, LOW BACK AND GUT AREA)    Joi Zamarripa CMA February 17, 2021  8:27 AM     Phone call duration: 35 minutes        INTERVENTIONAL RADIOLOGY CONSULTATION    HPI:   S/p microwave ablatio of segment HCC on 7/29/2020. He declined followup at 3 months, against advice. His primary care provider encouraged him to followup up with me when he saw her a few weeks ago.    Following treatment, he had abdominal pain immediately following the procedure 7/29/2020 that lasted for a few months. By the end of October to early November, his pain had completely resolved. He did have some injections and he could not recall if this relieved his pain, although his primary care provider states there was pain relief following injections. Upon further questioning, he states it did help for a few days.    His pain started again nearly one month ago. He feels pain at the right upper abdominal pain \"under the rib cage\". He is not certain if the pain radiated along a rib line. Pain radiates to the the right mid chest under the nipple line, with some radiation to the back. The pain also radiated to the \"intestines\" above the umbilicus and toward the medial left upper quadrant. He feels pain at the right upper abdoninal pain \"under the rib cage\". Pain radiates to " "the the mid chest and \"intestines\". Pain can be as bad as 7/10. Pain can get to zero with rest and percocet. He has not been sleeping well since the pain started last month.    Pain is sometimes exacerbated if he lays on his left side or his right side. Pain typically not worse with eating, but he did note that he had a significant pain eopisode after eating fried chicken. Pain actually improved with activity, including lifting. 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     Pain improves with laying down flat on his back, with exercise and with Percocet.    He occasionally has morning nausea until he has something to eat and drink.     No fever, vomiting, jaundice, abdominal distention, confusion, leg swelling. He occasionally has morning nausea until he has something to eat and drink. He has a prescription for omeprazole, but her is not taking it.      PAST MEDICAL HISTORY:   Past Medical History:   Diagnosis Date     Cirrhosis (H)      Continuous tobacco abuse      DM type 2 (diabetes mellitus, type 2) (H)      HCC (hepatocellular carcinoma) (H)      HCV (hepatitis C virus)      HTN (hypertension)          ALLERGIES:   Patient has no known allergies.    ROS:  As above    Physical Examination:   VITALS:   There were no vitals taken for this visit.    Constitutional: Answering questions appropriately  Respiratory: Non-labored breathing. Able to speak in complete sentences  Psychiatric: affect normal/bright and mentation appears normal.    Diagnostic studies:       Radiologist Interpretation:  IMPRESSION:    1. Cirrhosis and evidence of portal hypertension including  splenomegaly.  2. Post-ablation changes in the segment 8 with associated in inherent  T1 hyperintensity, compatible with coagulative necrosis. No definite  residual/recurrent tumor identified. LI-RADS TR nonviable.   3. Multiple scattered foci of arterial enhancement without washout,  pseudocapsule or restricted diffusion on DWI, most prominent in " the  segments 5/6, indeterminate for HCC. LI-RADS 3  4. Based on this exam only, the patient is within Dietrich criteria.    Assessment: 59 yo M with Hep C, EtOH cirrhosis with unresectable, 2.1 cm segment 8 HCC s/p percutaneous microwave ablation on 7/29/2020. His one month follow-up MRI showed no e/o residual tumor. He did not pursue his 3 month follow-up MRI or clinic visit due to personal issues. There is no evidence of residual disease on his current 6 month follow-up MRI.     He had RUQ pain following ablation that resolved 2-3 months post procedure. Unfortunatley, the pain returned approximately one month ago. His LFTs indicate no active liver inflammation to suggest the liver itself is a source of pain. CT and MRI do not reveal a cause for his pain. Some charactersitics raise concern for intercostal nerve pain and he did get some short term relief from intercostal block at his local health care facility, but advanced regional pain management services are not offered locally.      Plan:  1. He will restart his Omeprazole.  2. His pain is not classic, but some features are raise question of component of intercostal neuropathic pain. His case was discussed with Dr. Iris Suarez of the anesthesia pain management team, and he will be referred there for RF intercostal nerve ablation.   3. I encouraged him to follow-up with Dr. Mae.   4. Continued surveillance MRI is recommended. He has LIRADS 3 lesions on his current MRI.  5. I discussed his case and my recommendations with Yamileth Allan CNP ([phone]), Mr. Schoen's primary care provider, to discuss his case.     It was a pleasure to talk to Mr. Schoen on the telephone today. Thank you for involving the Interventional Radiology service in his care.    I spent a total of 35 minutes on this telephone visit, over 50% time was for counseling and care coordination.    Shira Sheth MD  Interventional Radiology   [facility]  Center    I spent a total of 35 minutes on the telephone with Jerome Schoen today.  Over 50% of this time was spent counseling the patient and/or coordinating care regarding HCC and abdominal pain.  See note for details.        HCC (hepatocellular carcinoma) (H)       Review of the result(s) of each unique test - MRI  {Discussion of management or test interpretation with external physician/other qualified healthcare professional/appropriate source - I discussed his case and my recommendations with Yamileth Allan CNP ([phone]), Mr. Schoen's primary care provider, to discuss his case.      20 minutes spent on the date of the encounter doing discussion with other provider(s)       ----- Ambulatory Services Attestations for Billing on Time -----      There are no diagnoses linked to this encounter.     Review of the result(s) of each unique test - MRI, CT    10 minutes spent on the date of the encounter doing interpretation of tests       Sincerely,      Shira Sheth MD       CC  Patient Care Team:  Yamileth Allan PA-C as PCP - Lupe Huntley MD as Assigned Gastroenterology Provider

## 2021-02-17 NOTE — PROGRESS NOTES
"    INTERVENTIONAL RADIOLOGY CONSULTATION    HPI:   S/p microwave ablatio of segment HCC on 7/29/2020. He declined followup at 3 months, against advice. His primary care provider encouraged him to followup up with me when he saw her a few weeks ago.    Following treatment, he had abdominal pain immediately following the procedure 7/29/2020 that lasted for a few months. By the end of October to early November, his pain had completely resolved. He did have some injections and he could not recall if this relieved his pain, although his primary care provider states there was pain relief following injections. Upon further questioning, he states it did help for a few days.    His pain started again nearly one month ago. He feels pain at the right upper abdominal pain \"under the rib cage\". He is not certain if the pain radiated along a rib line. Pain radiates to the the right mid chest under the nipple line, with some radiation to the back. The pain also radiated to the \"intestines\" above the umbilicus and toward the medial left upper quadrant. He feels pain at the right upper abdoninal pain \"under the rib cage\". Pain radiates to the the mid chest and \"intestines\". Pain can be as bad as 7/10. Pain can get to zero with rest and percocet. He has not been sleeping well since the pain started last month.    Pain is sometimes exacerbated if he lays on his left side or his right side. Pain typically not worse with eating, but he did note that he had a significant pain eopisode after eating fried chicken. Pain actually improved with activity, including lifting. 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     Pain improves with laying down flat on his back, with exercise and with Percocet.    He occasionally has morning nausea until he has something to eat and drink.     No fever, vomiting, jaundice, abdominal distention, confusion, leg swelling. He occasionally has morning nausea until he has something to eat and drink. He has a " prescription for omeprazole, but her is not taking it.      PAST MEDICAL HISTORY:   Past Medical History:   Diagnosis Date     Cirrhosis (H)      Continuous tobacco abuse      DM type 2 (diabetes mellitus, type 2) (H)      HCC (hepatocellular carcinoma) (H)      HCV (hepatitis C virus)      HTN (hypertension)          ALLERGIES:   Patient has no known allergies.    ROS:  As above    Physical Examination:   VITALS:   There were no vitals taken for this visit.    Constitutional: Answering questions appropriately  Respiratory: Non-labored breathing. Able to speak in complete sentences  Psychiatric: affect normal/bright and mentation appears normal.    Diagnostic studies:       Radiologist Interpretation:  IMPRESSION:    1. Cirrhosis and evidence of portal hypertension including  splenomegaly.  2. Post-ablation changes in the segment 8 with associated in inherent  T1 hyperintensity, compatible with coagulative necrosis. No definite  residual/recurrent tumor identified. LI-RADS TR nonviable.   3. Multiple scattered foci of arterial enhancement without washout,  pseudocapsule or restricted diffusion on DWI, most prominent in the  segments 5/6, indeterminate for HCC. LI-RADS 3  4. Based on this exam only, the patient is within Rbyce criteria.    Assessment: 59 yo M with Hep C, EtOH cirrhosis with unresectable, 2.1 cm segment 8 HCC s/p percutaneous microwave ablation on 7/29/2020. His one month follow-up MRI showed no e/o residual tumor. He did not pursue his 3 month follow-up MRI or clinic visit due to personal issues. There is no evidence of residual disease on his current 6 month follow-up MRI.     He had RUQ pain following ablation that resolved 2-3 months post procedure. Unfortunatley, the pain returned approximately one month ago. His LFTs indicate no active liver inflammation to suggest the liver itself is a source of pain. CT and MRI do not reveal a cause for his pain. Some charactersitics raise concern for  intercostal nerve pain and he did get some short term relief from intercostal block at his local health care facility, but advanced regional pain management services are not offered locally.      Plan:  1. He will restart his Omeprazole.  2. His pain is not classic, but some features are raise question of component of intercostal neuropathic pain. His case was discussed with Dr. Iris Suarez of the anesthesia pain management team, and he will be referred there for RF intercostal nerve ablation.   3. I encouraged him to follow-up with Dr. Mae.   4. Continued surveillance MRI is recommended. He has LIRADS 3 lesions on his current MRI.  5. I discussed his case and my recommendations with Yamileth Allan CNP ([phone]), Mr. Schoen's primary care provider, to discuss his case.     It was a pleasure to talk to Mr. Schoen on the telephone today. Thank you for involving the Interventional Radiology service in his care.    I spent a total of 35 minutes on this telephone visit, over 50% time was for counseling and care coordination.    Shira Sheth MD  Interventional Radiology   [facility]    I spent a total of 35 minutes on the telephone with Jerome Schoen today.  Over 50% of this time was spent counseling the patient and/or coordinating care regarding HCC and abdominal pain.  See note for details.        HCC (hepatocellular carcinoma) (H)       Review of the result(s) of each unique test - MRI  {Discussion of management or test interpretation with external physician/other qualified healthcare professional/appropriate source - I discussed his case and my recommendations with Yamileth Allan CNP ([phone]), Mr. Schoen's primary care provider, to discuss his case.      20 minutes spent on the date of the encounter doing discussion with other provider(s)                ----- Ambulatory Services Attestations for Billing on Time -----      There are no diagnoses linked to this  encounter.     Review of the result(s) of each unique test - MRI, CT    10 minutes spent on the date of the encounter doing interpretation of tests             CC  Patient Care Team:  Yamileth Ledbetter PA-C as PCP - Lupe Huntley MD as Assigned Gastroenterology Provider  YAMILETH LEDBETTER

## 2021-02-17 NOTE — TELEPHONE ENCOUNTER
"I called and spoke with Hema.  He does have about 90 pills of omeprazole, he hasn't taken in a few weeks.  He says he hasn't been eating spicy foods and therefore hasn't been taking.  We discussed a 30 day trial of daily 30mg Omeprazole to see if it helps with his pain.  He does not need updated labs and will cancel his lab appt at the [facility].  I have reached out to the pain management team to find out more about intercostal nerve ablation procedure.  Pt states he needs to be knocked out that the last injection through his back he only got 2 shots of fentanyl and was in so much pain, he may refuse procedure if he isn't better sedated.  His front rib/chest pain was only relieved for a day after this injection and been there ever since, he just \"deals\" with it.    OPAL Jaime RN, BSN  Interventional Radiology Nurse Coordinator   Phone:  [phone]    "

## 2021-02-17 NOTE — PATIENT INSTRUCTIONS
Hema you have had your virtual follow up today with Dr Sheth IR to discuss HCC.  Your MRI abdomen completed on 2/12/21 results have been discussed with you during this visit.    Plan:     Try taking 30 days worth of Omeprazole daily, we will contact you to see how this helps your pain.     Dr Sheth to discuss a possible ablation to intercostal nerve for chest/rib pain with pain management physician.    Please don't hesitate to contact us sooner with questions or concerns,     AIdania Jaime RN, BSN  Interventional Radiology Nurse Coordinator   Phone:  [phone]

## 2021-02-17 NOTE — PROGRESS NOTES
"Hema is a 60 year old who is being evaluated via a billable telephone visit.      What phone number would you like to be contacted at? [phone]  How would you like to obtain your AVS? Mail a copy     Vitals - Patient Reported  Weight (Patient Reported): 87.1 kg (192 lb)  Height (Patient Reported): 172.7 cm (5' 8\")  BMI (Based on Pt Reported Ht/Wt): 29.19  Pain Score: Extreme Pain (8)  Pain Loc: Other - see comment(RIGHT SIDE, LOW BACK AND GUT AREA)    Joi Zamarripa, ARNULFO February 17, 2021  8:27 AM     Phone call duration: 35 minutes  "

## 2021-02-24 ENCOUNTER — TELEPHONE (OUTPATIENT)
Dept: GASTROENTEROLOGY | Facility: CLINIC | Age: 61
End: 2021-02-24

## 2021-03-01 ENCOUNTER — TELEPHONE (OUTPATIENT)
Dept: RADIOLOGY | Facility: CLINIC | Age: 61
End: 2021-03-01

## 2021-03-01 ENCOUNTER — TELEPHONE (OUTPATIENT)
Dept: ANESTHESIOLOGY | Facility: CLINIC | Age: 61
End: 2021-03-01

## 2021-03-01 NOTE — TELEPHONE ENCOUNTER
[facility] Call Center    Phone Message    May a detailed message be left on voicemail: yes     Reason for Call: Other: Patient said Dr. Sheth spoke to Dr. Suarez and wanted patient to get an injection but before doing that patient will need to speak to Dr. Suarez first. There is no referral in [facility]. Please call patient to discuss this.      Action Taken: Message routed to:  [facility] Pain    Travel Screening: Not Applicable

## 2021-03-01 NOTE — TELEPHONE ENCOUNTER
I called Hema and he has an appointment with Dr Mae on 3/15, I have given him the phone number to make an appointment  Dr Iris Suarez pain management clinic.  OPAL Jaime, RN, BSN  Interventional Radiology Nurse Coordinator   Phone:  [phone]

## 2021-03-02 NOTE — TELEPHONE ENCOUNTER
I called and LVM for the pt informing him that Dr. Sheth needs to place a referral to our clinic for the procedure that he wants Dr. Suarez to do for the patient.    Then we will reach out to the pt if he needs a clinic evaluation or if he can just schedule the procedure. Dr. Suarez will determine this.    If you have any questions or concerns you can call [phone].    Shawanda Verdugo, CMA

## 2021-03-03 DIAGNOSIS — G58.8 INTERCOSTAL NEURALGIA: Primary | ICD-10-CM

## 2021-03-03 DIAGNOSIS — C22.0 HCC (HEPATOCELLULAR CARCINOMA) (H): Primary | ICD-10-CM

## 2021-03-03 DIAGNOSIS — R52 PAIN: ICD-10-CM

## 2021-03-09 ENCOUNTER — TELEPHONE (OUTPATIENT)
Dept: PALLIATIVE MEDICINE | Facility: CLINIC | Age: 61
End: 2021-03-09

## 2021-03-09 DIAGNOSIS — C22.0 HCC (HEPATOCELLULAR CARCINOMA) (H): ICD-10-CM

## 2021-03-09 DIAGNOSIS — K74.60 CIRRHOSIS OF LIVER WITHOUT ASCITES, UNSPECIFIED HEPATIC CIRRHOSIS TYPE (H): Primary | ICD-10-CM

## 2021-03-09 NOTE — TELEPHONE ENCOUNTER
Attempt # 1    I called Hema to schedule a procedure with Dr. Suarez. Left a voicemail with my call back number and request that they leave a good call back time if they get my voicemail.    Lelia WELSH  Alee-Op Coordinator

## 2021-03-10 ENCOUNTER — HOSPITAL ENCOUNTER (OUTPATIENT)
Facility: AMBULATORY SURGERY CENTER | Age: 61
End: 2021-03-10
Attending: ANESTHESIOLOGY
Payer: COMMERCIAL

## 2021-03-10 DIAGNOSIS — G58.8 INTERCOSTAL NEURALGIA: ICD-10-CM

## 2021-03-10 NOTE — TELEPHONE ENCOUNTER
Spoke with Hema about scheduling his Intercostal Nerve Pulsed Radiofrequency Ablation on 3/26/21 with Dr. Suarez. Per Hema's request, I sent a request to Dr. Suarez to prescribe him Valium and also sent a message to [employer] to get the prior auth. Started. Hema is going to check with his [facility] clinic and see that the COVID test he could get there is a ND test.    Thank you,    Lelia Bray  [facility]  Perioperative

## 2021-03-11 DIAGNOSIS — Z11.59 ENCOUNTER FOR SCREENING FOR OTHER VIRAL DISEASES: ICD-10-CM

## 2021-03-15 ENCOUNTER — VIRTUAL VISIT (OUTPATIENT)
Dept: GASTROENTEROLOGY | Facility: CLINIC | Age: 61
End: 2021-03-15
Attending: INTERNAL MEDICINE
Payer: COMMERCIAL

## 2021-03-15 ENCOUNTER — TELEPHONE (OUTPATIENT)
Dept: ANESTHESIOLOGY | Facility: CLINIC | Age: 61
End: 2021-03-15

## 2021-03-15 DIAGNOSIS — K70.30 ALCOHOLIC CIRRHOSIS OF LIVER WITHOUT ASCITES (H): Primary | ICD-10-CM

## 2021-03-15 DIAGNOSIS — C22.0 HCC (HEPATOCELLULAR CARCINOMA) (H): ICD-10-CM

## 2021-03-15 PROCEDURE — 99214 OFFICE O/P EST MOD 30 MIN: CPT | Mod: TEL | Performed by: INTERNAL MEDICINE

## 2021-03-15 ASSESSMENT — PAIN SCALES - GENERAL: PAINLEVEL: EXTREME PAIN (8)

## 2021-03-15 NOTE — TELEPHONE ENCOUNTER
Called and spoke with Hema about cancelling his procedure with Dr. Suarez. Hema's insurance would not cover the cost of his procedure. Hema declined setting up a follow up visit with Dr. Suarez.    Thank you,  Lelia

## 2021-03-15 NOTE — LETTER
3/15/2021      RE: Jerome Schoen  [address]  [place] [address]       Hema is a 60 year old who is being evaluated via a billable telephone visit.      What phone number would you like to be contacted at? [phone]  How would you like to obtain your AVS? 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      SUBJECTIVE:  Mr. Schoen is a 60-year-old  male who carries a diagnosis of hepatitis C.  He has also a significant past history of alcohol abuse.  He did get diagnosed with cirrhosis of the liver.  He had also treatment with glecaprevir/pibrentasvir for 8 weeks and he cleared the virus and achieved a sustained virological response.        Imaging was done, which was CT scan with and without IV contrast, showed that he is cirrhotic, but has multiple indeterminate foci of around 1.8 cm and these were likely LI-RADS 3, but an MRI was not done because he has claustrophobia.        On 06/12/2020, he had CT scan and it showed a 1.8 cm arterially enhancing lesion with pseudocapsule formation and this was read as LI-RADS 5.  It was in segment 4A .  At that point, the patient followed up with Dr. Sheth from Radiology, and she did treat the lesion, a 2.1 cm lesion.  He did have a very low MELD score.  This was done Microwave ablation. The lesion was described as been  2.5 cm and  Unresectable in  right hepatic lobe.     His last imaging, which was on 02/12/2020 showed, besides the cirrhosis, post-ablation changes in segment 8.  No definite residual/recurrent tumor identified, so this was read as LI-RADS TR5.  There were also other indeterminate lesions.        Symptom-wise, he is not having any significant edema or abdominal distention.  He denies any jaundice, lethargy or confusion.  He has lost weight from 230 to  188.  He did not have any gastrointestinal bleeding, no abdominal pain, nausea or vomiting.  He is having bowel movements with MiraLax and this is every other day.        He has not done his colonoscopy and wanted to postpone until next year.  No fever.  He had the Moderna vaccine for COVID.     Medical hx Surgical hx   Past Medical History:   Diagnosis Date     Cirrhosis (H)      Continuous tobacco abuse      DM type 2 (diabetes mellitus, type 2) (H)      HCC (hepatocellular carcinoma) (H)      HCV (hepatitis C virus)      HTN (hypertension)       Past Surgical History:   Procedure Laterality Date     APPENDECTOMY       ARTHROSCOPY KNEE BILATERAL       CHOLECYSTECTOMY       IR VISCERAL ANGIOGRAM  7/29/2020          Medications  Prior to Admission medications    Medication Sig Start Date End Date Taking? Authorizing Provider   APPLE CIDER VINEGAR PO Take 1 capsule by mouth as needed   Yes Reported, Patient   atenolol (TENORMIN) 100 MG tablet Take 100 mg by mouth every morning  3/11/19  Yes Reported, Patient   chlorthalidone (HYGROTON) 25 MG tablet Take 25 mg by mouth every morning  2/1/19  Yes Reported, Patient   glipiZIDE (GLUCOTROL XL) 10 MG 24 hr tablet Take 10 mg by mouth every morning  2/1/19  Yes Reported, Patient   lisinopril (ZESTRIL) 20 MG tablet Take 40 mg by mouth every morning    Yes Reported, Patient   metFORMIN (GLUCOPHAGE) 500 MG tablet Take 500 mg by mouth every morning  2/1/19  Yes Reported, Patient   OMEPRAZOLE PO Take 30 mg by mouth as needed    Yes Reported, Patient   polyethylene glycol (MIRALAX) 17 GM/SCOOP powder Take 1 capful by mouth as needed  4/5/19  Yes Reported, Patient   PRAVASTATIN SODIUM PO Take 40 mg by mouth daily   Yes Reported, Patient   diazepam (VALIUM) 5 MG tablet take 2 tablets by mouth once prior to MRI 8/20/20   Reported, Patient   insulin aspart (NOVOLOG FLEXPEN) 100 UNIT/ML pen Inject 4 Units Subcutaneous 8/6/20 8/6/21  Reported, Patient   methylPREDNISolone (MEDROL) 4 MG  tablet therapy pack Follow Package Directions  Patient not taking: Reported on 2/17/2021 8/3/20   Virginia Nguyễn APRN CNP   ondansetron (ZOFRAN) 4 MG tablet Take 1 tablet (4 mg) by mouth every 6 hours as needed for nausea  Patient not taking: Reported on 2/17/2021 7/29/20   Jamal Velez MD   oxyCODONE IR (ROXICODONE) 10 MG tablet Take 5 mg by mouth as needed for severe pain     Reported, Patient       Allergies  No Known Allergies    Review of systems  A 10-point review of systems was negative    Examination  There were no vitals taken for this visit.  There is no height or weight on file to calculate BMI.    Gen- well, NAD, A+Ox3, normal color  Psych- normal mood    Laboratory  Lab Results   Component Value Date     07/29/2020    POTASSIUM 3.4 07/29/2020    CHLORIDE 103 07/29/2020    CO2 32 07/29/2020    BUN 11 07/29/2020    CR 0.61 07/29/2020       Lab Results   Component Value Date    BILITOTAL 1.0 07/29/2020    ALT 27 07/29/2020    AST 18 07/29/2020    ALKPHOS 87 07/29/2020       Lab Results   Component Value Date    ALBUMIN 3.6 07/29/2020    PROTTOTAL 7.5 07/29/2020        Lab Results   Component Value Date    WBC 5.8 07/29/2020    HGB 15.2 07/29/2020    MCV 86 07/29/2020    PLT 64 07/29/2020       Lab Results   Component Value Date    INR 1.13 07/29/2020     MELD-Na score: 7 at 7/29/2020 11:42 AM  MELD score: 7 at 7/29/2020 11:42 AM  Calculated from:  Serum Creatinine: 0.61 mg/dL (Rounded to 1 mg/dL) at 7/29/2020 11:42 AM  Serum Sodium: 136 mmol/L at 7/29/2020 11:42 AM  Total Bilirubin: 1.0 mg/dL at 7/29/2020 11:42 AM  INR(ratio): 1.13 at 7/29/2020 11:42 AM  Age: 60 years    Radiology    ASSESSMENT AND PLAN:  HCC in the context of hepatitis C and alcohol-related cirrhosis.        Mr. Schoen had a microwave ablation of segment 8 lesion 07/29/2020.  He is relatively doing quite well.  The lesion at that time was 2.1, which qualifies him for [place] criteria.  The patient is not interested  in pursuing liver transplantation.        He will need also to see Oncology as he is not interested in liver transplantation.      For his hepatitis C, he was treated and he achieved a sustained virological response.        Mr. Schoen also as well as care maintenance issues are concerned, he has not had a colonoscopy and wants to postpone it for next year.  He has received the Moderna COVID vaccine.      Please note that the patient some intercostal neuropathy, which he is going to the Pain Clinic to see one of the anesthesiologists for treatment.        Otherwise, for his health care maintenance issues, he will follow with his primary care physician and we will see him here in 3-6 months.     Lupe Mae MD  Hepatology  [facility]

## 2021-03-15 NOTE — LETTER
3/15/2021         RE: Jerome Schoen  [address] [address]        Dear Colleague,    Thank you for referring your patient, Jerome Schoen, to the [facility] [place]. Please see a copy of my visit note below.    Hema is a 60 year old who is being evaluated via a billable telephone visit.      What phone number would you like to be contacted at? [phone]  How would you like to obtain your AVS? Mail a copy  Phone call duration: 30  minutes    [facility]    Hepatology follow-up    CHIEF COMPLAINT AND REASON FOR VISIT:  Hepatitis C-related cirrhosis complicated by hepatocellular carcinoma.      SUBJECTIVE:  Mr. Schoen is a 60-year-old  male who carries a diagnosis of hepatitis C.  He has also a significant past history of alcohol abuse.  He did get diagnosed with cirrhosis of the liver.  He had also treatment with glecaprevir/pibrentasvir for 8 weeks and he cleared the virus and achieved a sustained virological response.        Imaging was done, which was CT scan with and without IV contrast, showed that he is cirrhotic, but has multiple indeterminate foci of around 1.8 cm and these were likely LI-RADS 3, but an MRI was not done because he has claustrophobia.        On 06/12/2020, he had CT scan and it showed a 1.8 cm arterially enhancing lesion with pseudocapsule formation and this was read as LI-RADS 5.  It was in segment 4A .  At that point, the patient followed up with Dr. Sheth from Radiology, and she did treat the lesion, a 2.1 cm lesion.  He did have a very low MELD score.  This was done Microwave ablation. The lesion was described as been  2.5 cm and  Unresectable in  right hepatic lobe.     His last imaging, which was on 02/12/2020 showed, besides the cirrhosis, post-ablation changes in segment 8.  No definite residual/recurrent tumor identified, so this was read as LI-RADS TR5.  There were also other  indeterminate lesions.        Symptom-wise, he is not having any significant edema or abdominal distention.  He denies any jaundice, lethargy or confusion.  He has lost weight from 230 to 188.  He did not have any gastrointestinal bleeding, no abdominal pain, nausea or vomiting.  He is having bowel movements with MiraLax and this is every other day.        He has not done his colonoscopy and wanted to postpone until next year.  No fever.  He had the Moderna vaccine for COVID.     Medical hx Surgical hx   Past Medical History:   Diagnosis Date     Cirrhosis (H)      Continuous tobacco abuse      DM type 2 (diabetes mellitus, type 2) (H)      HCC (hepatocellular carcinoma) (H)      HCV (hepatitis C virus)      HTN (hypertension)       Past Surgical History:   Procedure Laterality Date     APPENDECTOMY       ARTHROSCOPY KNEE BILATERAL       CHOLECYSTECTOMY       IR VISCERAL ANGIOGRAM  7/29/2020          Medications  Prior to Admission medications    Medication Sig Start Date End Date Taking? Authorizing Provider   APPLE CIDER VINEGAR PO Take 1 capsule by mouth as needed   Yes Reported, Patient   atenolol (TENORMIN) 100 MG tablet Take 100 mg by mouth every morning  3/11/19  Yes Reported, Patient   chlorthalidone (HYGROTON) 25 MG tablet Take 25 mg by mouth every morning  2/1/19  Yes Reported, Patient   glipiZIDE (GLUCOTROL XL) 10 MG 24 hr tablet Take 10 mg by mouth every morning  2/1/19  Yes Reported, Patient   lisinopril (ZESTRIL) 20 MG tablet Take 40 mg by mouth every morning    Yes Reported, Patient   metFORMIN (GLUCOPHAGE) 500 MG tablet Take 500 mg by mouth every morning  2/1/19  Yes Reported, Patient   OMEPRAZOLE PO Take 30 mg by mouth as needed    Yes Reported, Patient   polyethylene glycol (MIRALAX) 17 GM/SCOOP powder Take 1 capful by mouth as needed  4/5/19  Yes Reported, Patient   PRAVASTATIN SODIUM PO Take 40 mg by mouth daily   Yes Reported, Patient   diazepam (VALIUM) 5 MG tablet take 2 tablets by mouth once  prior to MRI 8/20/20   Reported, Patient   insulin aspart (NOVOLOG FLEXPEN) 100 UNIT/ML pen Inject 4 Units Subcutaneous 8/6/20 8/6/21  Reported, Patient   methylPREDNISolone (MEDROL) 4 MG tablet therapy pack Follow Package Directions  Patient not taking: Reported on 2/17/2021 8/3/20   Virginia Nguyễn APRN CNP   ondansetron (ZOFRAN) 4 MG tablet Take 1 tablet (4 mg) by mouth every 6 hours as needed for nausea  Patient not taking: Reported on 2/17/2021 7/29/20   Jamal Velez MD   oxyCODONE IR (ROXICODONE) 10 MG tablet Take 5 mg by mouth as needed for severe pain     Reported, Patient       Allergies  No Known Allergies    Review of systems  A 10-point review of systems was negative    Examination  There were no vitals taken for this visit.  There is no height or weight on file to calculate BMI.    Gen- well, NAD, A+Ox3, normal color  Psych- normal mood    Laboratory  Lab Results   Component Value Date     07/29/2020    POTASSIUM 3.4 07/29/2020    CHLORIDE 103 07/29/2020    CO2 32 07/29/2020    BUN 11 07/29/2020    CR 0.61 07/29/2020       Lab Results   Component Value Date    BILITOTAL 1.0 07/29/2020    ALT 27 07/29/2020    AST 18 07/29/2020    ALKPHOS 87 07/29/2020       Lab Results   Component Value Date    ALBUMIN 3.6 07/29/2020    PROTTOTAL 7.5 07/29/2020        Lab Results   Component Value Date    WBC 5.8 07/29/2020    HGB 15.2 07/29/2020    MCV 86 07/29/2020    PLT 64 07/29/2020       Lab Results   Component Value Date    INR 1.13 07/29/2020     MELD-Na score: 7 at 7/29/2020 11:42 AM  MELD score: 7 at 7/29/2020 11:42 AM  Calculated from:  Serum Creatinine: 0.61 mg/dL (Rounded to 1 mg/dL) at 7/29/2020 11:42 AM  Serum Sodium: 136 mmol/L at 7/29/2020 11:42 AM  Total Bilirubin: 1.0 mg/dL at 7/29/2020 11:42 AM  INR(ratio): 1.13 at 7/29/2020 11:42 AM  Age: 60 years    Radiology    ASSESSMENT AND PLAN:  HCC in the context of hepatitis C and alcohol-related cirrhosis.        Mr. Schoen had a  microwave ablation of segment 8 lesion 07/29/2020.  He is relatively doing quite well.  The lesion at that time was 2.1, which qualifies him for [place] criteria.  The patient is not interested in pursuing liver transplantation.        He will need also to see Oncology as he is not interested in liver transplantation.      For his hepatitis C, he was treated and he achieved a sustained virological response.        Mr. Schoen also as well as care maintenance issues are concerned, he has not had a colonoscopy and wants to postpone it for next year.  He has received the Moderna COVID vaccine.      Please note that the patient some intercostal neuropathy, which he is going to the Pain Clinic to see one of the anesthesiologists for treatment.        Otherwise, for his health care maintenance issues, he will follow with his primary care physician and we will see him here in 3-6 months.     Lupe Mae MD  Hepatology  [facility]      Again, thank you for allowing me to participate in the care of your patient.        Sincerely,        Lupe Mae MD

## 2021-03-15 NOTE — PROGRESS NOTES
Hema is a 60 year old who is being evaluated via a billable telephone visit.      What phone number would you like to be contacted at? [phone]  How would you like to obtain your AVS? 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      SUBJECTIVE:  Mr. Schoen is a 60-year-old  male who carries a diagnosis of hepatitis C.  He has also a significant past history of alcohol abuse.  He did get diagnosed with cirrhosis of the liver.  He had also treatment with glecaprevir/pibrentasvir for 8 weeks and he cleared the virus and achieved a sustained virological response.        Imaging was done, which was CT scan with and without IV contrast, showed that he is cirrhotic, but has multiple indeterminate foci of around 1.8 cm and these were likely LI-RADS 3, but an MRI was not done because he has claustrophobia.        On 06/12/2020, he had CT scan and it showed a 1.8 cm arterially enhancing lesion with pseudocapsule formation and this was read as LI-RADS 5.  It was in segment 4A .  At that point, the patient followed up with Dr. Sheth from Radiology, and she did treat the lesion, a 2.1 cm lesion.  He did have a very low MELD score.  This was done Microwave ablation. The lesion was described as been  2.5 cm and  Unresectable in  right hepatic lobe.     His last imaging, which was on 02/12/2020 showed, besides the cirrhosis, post-ablation changes in segment 8.  No definite residual/recurrent tumor identified, so this was read as LI-RADS TR5.  There were also other indeterminate lesions.        Symptom-wise, he is not having any significant edema or abdominal distention.  He denies any jaundice, lethargy or confusion.  He has lost weight from 230 to 188.  He did not have any gastrointestinal bleeding, no abdominal pain, nausea or  vomiting.  He is having bowel movements with MiraLax and this is every other day.        He has not done his colonoscopy and wanted to postpone until next year.  No fever.  He had the Moderna vaccine for COVID.     Medical hx Surgical hx   Past Medical History:   Diagnosis Date     Cirrhosis (H)      Continuous tobacco abuse      DM type 2 (diabetes mellitus, type 2) (H)      HCC (hepatocellular carcinoma) (H)      HCV (hepatitis C virus)      HTN (hypertension)       Past Surgical History:   Procedure Laterality Date     APPENDECTOMY       ARTHROSCOPY KNEE BILATERAL       CHOLECYSTECTOMY       IR VISCERAL ANGIOGRAM  7/29/2020          Medications  Prior to Admission medications    Medication Sig Start Date End Date Taking? Authorizing Provider   APPLE CIDER VINEGAR PO Take 1 capsule by mouth as needed   Yes Reported, Patient   atenolol (TENORMIN) 100 MG tablet Take 100 mg by mouth every morning  3/11/19  Yes Reported, Patient   chlorthalidone (HYGROTON) 25 MG tablet Take 25 mg by mouth every morning  2/1/19  Yes Reported, Patient   glipiZIDE (GLUCOTROL XL) 10 MG 24 hr tablet Take 10 mg by mouth every morning  2/1/19  Yes Reported, Patient   lisinopril (ZESTRIL) 20 MG tablet Take 40 mg by mouth every morning    Yes Reported, Patient   metFORMIN (GLUCOPHAGE) 500 MG tablet Take 500 mg by mouth every morning  2/1/19  Yes Reported, Patient   OMEPRAZOLE PO Take 30 mg by mouth as needed    Yes Reported, Patient   polyethylene glycol (MIRALAX) 17 GM/SCOOP powder Take 1 capful by mouth as needed  4/5/19  Yes Reported, Patient   PRAVASTATIN SODIUM PO Take 40 mg by mouth daily   Yes Reported, Patient   diazepam (VALIUM) 5 MG tablet take 2 tablets by mouth once prior to MRI 8/20/20   Reported, Patient   insulin aspart (NOVOLOG FLEXPEN) 100 UNIT/ML pen Inject 4 Units Subcutaneous 8/6/20 8/6/21  Reported, Patient   methylPREDNISolone (MEDROL) 4 MG tablet therapy pack Follow Package Directions  Patient not taking: Reported on  2/17/2021 8/3/20   Virginia Nguyễn APRN CNP   ondansetron (ZOFRAN) 4 MG tablet Take 1 tablet (4 mg) by mouth every 6 hours as needed for nausea  Patient not taking: Reported on 2/17/2021 7/29/20   Jamal Velez MD   oxyCODONE IR (ROXICODONE) 10 MG tablet Take 5 mg by mouth as needed for severe pain     Reported, Patient       Allergies  No Known Allergies    Review of systems  A 10-point review of systems was negative    Examination  There were no vitals taken for this visit.  There is no height or weight on file to calculate BMI.    Gen- well, NAD, A+Ox3, normal color  Psych- normal mood    Laboratory  Lab Results   Component Value Date     07/29/2020    POTASSIUM 3.4 07/29/2020    CHLORIDE 103 07/29/2020    CO2 32 07/29/2020    BUN 11 07/29/2020    CR 0.61 07/29/2020       Lab Results   Component Value Date    BILITOTAL 1.0 07/29/2020    ALT 27 07/29/2020    AST 18 07/29/2020    ALKPHOS 87 07/29/2020       Lab Results   Component Value Date    ALBUMIN 3.6 07/29/2020    PROTTOTAL 7.5 07/29/2020        Lab Results   Component Value Date    WBC 5.8 07/29/2020    HGB 15.2 07/29/2020    MCV 86 07/29/2020    PLT 64 07/29/2020       Lab Results   Component Value Date    INR 1.13 07/29/2020     MELD-Na score: 7 at 7/29/2020 11:42 AM  MELD score: 7 at 7/29/2020 11:42 AM  Calculated from:  Serum Creatinine: 0.61 mg/dL (Rounded to 1 mg/dL) at 7/29/2020 11:42 AM  Serum Sodium: 136 mmol/L at 7/29/2020 11:42 AM  Total Bilirubin: 1.0 mg/dL at 7/29/2020 11:42 AM  INR(ratio): 1.13 at 7/29/2020 11:42 AM  Age: 60 years    Radiology    ASSESSMENT AND PLAN:  HCC in the context of hepatitis C and alcohol-related cirrhosis.        Mr. Schoen had a microwave ablation of segment 8 lesion 07/29/2020.  He is relatively doing quite well.  The lesion at that time was 2.1, which qualifies him for [place] criteria.  The patient is not interested in pursuing liver transplantation.        He will need also to see Oncology as  he is not interested in liver transplantation.      For his hepatitis C, he was treated and he achieved a sustained virological response.        Mr. Schoen also as well as care maintenance issues are concerned, he has not had a colonoscopy and wants to postpone it for next year.  He has received the Moderna COVID vaccine.      Please note that the patient some intercostal neuropathy, which he is going to the Pain Clinic to see one of the anesthesiologists for treatment.        Otherwise, for his health care maintenance issues, he will follow with his primary care physician and we will see him here in 3-6 months.     Lupe Mae MD  Hepatology  [facility]

## 2021-04-12 ENCOUNTER — TELEPHONE (OUTPATIENT)
Dept: ANESTHESIOLOGY | Facility: CLINIC | Age: 61
End: 2021-04-12

## 2021-04-12 NOTE — TELEPHONE ENCOUNTER
Called Hema after receiving a message from a nurse that Hema was trying to reach me. I left  voicemail for Hema with my call back number. It was unclear what Hema was calling in regards to, so I left Karen's number in case he had prior authorization questions since his was denied for his last procedure.    Thank you,    Lelia WELSH  Perioperative

## 2022-08-22 NOTE — RESULT ENCOUNTER NOTE
----- Message from Fabienne Morton Labs reviewed and are okay for surgery.     Toña Corona DNP, RN, ANP-C

## (undated) RX ORDER — ONDANSETRON 2 MG/ML
INJECTION INTRAMUSCULAR; INTRAVENOUS
Status: DISPENSED
Start: 2020-07-29

## (undated) RX ORDER — AMPICILLIN AND SULBACTAM 2; 1 G/1; G/1
INJECTION, POWDER, FOR SOLUTION INTRAMUSCULAR; INTRAVENOUS
Status: DISPENSED
Start: 2020-07-29

## (undated) RX ORDER — FENTANYL CITRATE-0.9 % NACL/PF 10 MCG/ML
PLASTIC BAG, INJECTION (ML) INTRAVENOUS
Status: DISPENSED
Start: 2020-07-29

## (undated) RX ORDER — FENTANYL CITRATE 50 UG/ML
INJECTION, SOLUTION INTRAMUSCULAR; INTRAVENOUS
Status: DISPENSED
Start: 2020-07-29

## (undated) RX ORDER — ESMOLOL HYDROCHLORIDE 10 MG/ML
INJECTION INTRAVENOUS
Status: DISPENSED
Start: 2020-07-29

## (undated) RX ORDER — PROPOFOL 10 MG/ML
INJECTION, EMULSION INTRAVENOUS
Status: DISPENSED
Start: 2020-07-29

## (undated) RX ORDER — EPHEDRINE SULFATE 50 MG/ML
INJECTION, SOLUTION INTRAMUSCULAR; INTRAVENOUS; SUBCUTANEOUS
Status: DISPENSED
Start: 2020-07-29